# Patient Record
Sex: FEMALE | Race: WHITE | NOT HISPANIC OR LATINO | Employment: OTHER | URBAN - METROPOLITAN AREA
[De-identification: names, ages, dates, MRNs, and addresses within clinical notes are randomized per-mention and may not be internally consistent; named-entity substitution may affect disease eponyms.]

---

## 2017-01-05 ENCOUNTER — OFFICE VISIT - HEALTHEAST (OUTPATIENT)
Dept: INTERNAL MEDICINE | Facility: CLINIC | Age: 72
End: 2017-01-05

## 2017-01-05 DIAGNOSIS — E06.3 HYPOTHYROIDISM DUE TO HASHIMOTO'S THYROIDITIS: ICD-10-CM

## 2017-01-05 ASSESSMENT — MIFFLIN-ST. JEOR: SCORE: 1279.92

## 2017-01-06 ENCOUNTER — COMMUNICATION - HEALTHEAST (OUTPATIENT)
Dept: INTERNAL MEDICINE | Facility: CLINIC | Age: 72
End: 2017-01-06

## 2017-01-06 ENCOUNTER — AMBULATORY - HEALTHEAST (OUTPATIENT)
Dept: INTERNAL MEDICINE | Facility: CLINIC | Age: 72
End: 2017-01-06

## 2017-02-08 ENCOUNTER — HOSPITAL ENCOUNTER (OUTPATIENT)
Dept: MAMMOGRAPHY | Facility: CLINIC | Age: 72
Discharge: HOME | End: 2017-02-08
Attending: INTERNAL MEDICINE

## 2017-02-08 DIAGNOSIS — Z12.31 VISIT FOR SCREENING MAMMOGRAM: ICD-10-CM

## 2017-03-30 ENCOUNTER — RECORDS - HEALTHEAST (OUTPATIENT)
Dept: ADMINISTRATIVE | Facility: OTHER | Age: 72
End: 2017-03-30

## 2017-04-20 ENCOUNTER — OFFICE VISIT - HEALTHEAST (OUTPATIENT)
Dept: INTERNAL MEDICINE | Facility: CLINIC | Age: 72
End: 2017-04-20

## 2017-04-20 DIAGNOSIS — Z00.00 ROUTINE GENERAL MEDICAL EXAMINATION AT A HEALTH CARE FACILITY: ICD-10-CM

## 2017-04-20 DIAGNOSIS — I10 ESSENTIAL HYPERTENSION WITH GOAL BLOOD PRESSURE LESS THAN 140/90: ICD-10-CM

## 2017-04-20 ASSESSMENT — MIFFLIN-ST. JEOR: SCORE: 1307.14

## 2017-04-21 LAB
CHOLEST SERPL-MCNC: 192 MG/DL
FASTING STATUS PATIENT QL REPORTED: YES
HDLC SERPL-MCNC: 56 MG/DL
LDLC SERPL CALC-MCNC: 104 MG/DL
TRIGL SERPL-MCNC: 158 MG/DL

## 2017-04-24 ENCOUNTER — COMMUNICATION - HEALTHEAST (OUTPATIENT)
Dept: INTERNAL MEDICINE | Facility: CLINIC | Age: 72
End: 2017-04-24

## 2017-05-09 ENCOUNTER — COMMUNICATION - HEALTHEAST (OUTPATIENT)
Dept: INTERNAL MEDICINE | Facility: CLINIC | Age: 72
End: 2017-05-09

## 2017-05-09 ENCOUNTER — OFFICE VISIT - HEALTHEAST (OUTPATIENT)
Dept: INTERNAL MEDICINE | Facility: CLINIC | Age: 72
End: 2017-05-09

## 2017-05-09 DIAGNOSIS — I82.412 ACUTE DEEP VEIN THROMBOSIS (DVT) OF FEMORAL VEIN OF LEFT LOWER EXTREMITY (H): ICD-10-CM

## 2017-05-09 DIAGNOSIS — M21.371 FOOT DROP, RIGHT: ICD-10-CM

## 2017-05-09 DIAGNOSIS — I87.1 MAY-THURNER SYNDROME: ICD-10-CM

## 2017-05-09 DIAGNOSIS — S72.009A FEMORAL NECK FRACTURE (H): ICD-10-CM

## 2017-05-09 ASSESSMENT — MIFFLIN-ST. JEOR: SCORE: 1315.08

## 2017-05-10 ENCOUNTER — COMMUNICATION - HEALTHEAST (OUTPATIENT)
Dept: INTERNAL MEDICINE | Facility: CLINIC | Age: 72
End: 2017-05-10

## 2017-05-10 DIAGNOSIS — I82.412 ACUTE DEEP VEIN THROMBOSIS (DVT) OF FEMORAL VEIN OF LEFT LOWER EXTREMITY (H): ICD-10-CM

## 2017-05-12 ENCOUNTER — COMMUNICATION - HEALTHEAST (OUTPATIENT)
Dept: INFUSION THERAPY | Facility: HOSPITAL | Age: 72
End: 2017-05-12

## 2017-05-12 ENCOUNTER — COMMUNICATION - HEALTHEAST (OUTPATIENT)
Dept: NURSING | Facility: CLINIC | Age: 72
End: 2017-05-12

## 2017-05-12 DIAGNOSIS — I82.412 ACUTE DEEP VEIN THROMBOSIS (DVT) OF FEMORAL VEIN OF LEFT LOWER EXTREMITY (H): ICD-10-CM

## 2017-05-15 ENCOUNTER — COMMUNICATION - HEALTHEAST (OUTPATIENT)
Dept: INTERNAL MEDICINE | Facility: CLINIC | Age: 72
End: 2017-05-15

## 2017-06-01 ENCOUNTER — OFFICE VISIT - HEALTHEAST (OUTPATIENT)
Dept: INTERNAL MEDICINE | Facility: CLINIC | Age: 72
End: 2017-06-01

## 2017-06-01 DIAGNOSIS — I82.422 ACUTE DEEP VEIN THROMBOSIS (DVT) OF ILIAC VEIN OF LEFT LOWER EXTREMITY (H): ICD-10-CM

## 2017-06-01 ASSESSMENT — MIFFLIN-ST. JEOR: SCORE: 1296.94

## 2017-07-01 ENCOUNTER — RECORDS - HEALTHEAST (OUTPATIENT)
Dept: ADMINISTRATIVE | Facility: OTHER | Age: 72
End: 2017-07-01

## 2017-07-20 ENCOUNTER — RECORDS - HEALTHEAST (OUTPATIENT)
Dept: ADMINISTRATIVE | Facility: OTHER | Age: 72
End: 2017-07-20

## 2017-07-21 ENCOUNTER — RECORDS - HEALTHEAST (OUTPATIENT)
Dept: ADMINISTRATIVE | Facility: OTHER | Age: 72
End: 2017-07-21

## 2017-07-26 ENCOUNTER — RECORDS - HEALTHEAST (OUTPATIENT)
Dept: ADMINISTRATIVE | Facility: OTHER | Age: 72
End: 2017-07-26

## 2017-08-11 ENCOUNTER — OFFICE VISIT - HEALTHEAST (OUTPATIENT)
Dept: INTERNAL MEDICINE | Facility: CLINIC | Age: 72
End: 2017-08-11

## 2017-08-11 DIAGNOSIS — S72.001S: ICD-10-CM

## 2017-08-11 ASSESSMENT — MIFFLIN-ST. JEOR: SCORE: 1301.47

## 2017-08-30 ENCOUNTER — AMBULATORY - HEALTHEAST (OUTPATIENT)
Dept: ONCOLOGY | Facility: HOSPITAL | Age: 72
End: 2017-08-30

## 2017-08-30 DIAGNOSIS — I87.1 MAY-THURNER SYNDROME: ICD-10-CM

## 2017-08-30 DIAGNOSIS — I82.422 ACUTE DEEP VEIN THROMBOSIS (DVT) OF ILIAC VEIN OF LEFT LOWER EXTREMITY (H): ICD-10-CM

## 2017-09-19 ENCOUNTER — COMMUNICATION - HEALTHEAST (OUTPATIENT)
Dept: INTERNAL MEDICINE | Facility: CLINIC | Age: 72
End: 2017-09-19

## 2017-09-19 ENCOUNTER — AMBULATORY - HEALTHEAST (OUTPATIENT)
Dept: INTERNAL MEDICINE | Facility: CLINIC | Age: 72
End: 2017-09-19

## 2017-10-19 ENCOUNTER — AMBULATORY - HEALTHEAST (OUTPATIENT)
Dept: NURSING | Facility: CLINIC | Age: 72
End: 2017-10-19

## 2017-11-08 ENCOUNTER — AMBULATORY - HEALTHEAST (OUTPATIENT)
Dept: INFUSION THERAPY | Facility: HOSPITAL | Age: 72
End: 2017-11-08

## 2017-11-08 DIAGNOSIS — I82.422 ACUTE DEEP VEIN THROMBOSIS (DVT) OF ILIAC VEIN OF LEFT LOWER EXTREMITY (H): ICD-10-CM

## 2017-11-13 ENCOUNTER — COMMUNICATION - HEALTHEAST (OUTPATIENT)
Dept: ADMINISTRATIVE | Facility: HOSPITAL | Age: 72
End: 2017-11-13

## 2017-11-15 ENCOUNTER — OFFICE VISIT - HEALTHEAST (OUTPATIENT)
Dept: ONCOLOGY | Facility: HOSPITAL | Age: 72
End: 2017-11-15

## 2017-11-15 DIAGNOSIS — I82.412 ACUTE DEEP VEIN THROMBOSIS (DVT) OF FEMORAL VEIN OF LEFT LOWER EXTREMITY (H): ICD-10-CM

## 2017-11-15 LAB
DRVVT, LUPUS ANTICOAGULANT - HISTORICAL: 72 SEC
PROTEIN C ACTIVITY - HISTORICAL: 136 % (ref 76–137)
PROTEIN S ACTIVITY - HISTORICAL: 119 % (ref 62–102)

## 2018-01-19 ENCOUNTER — COMMUNICATION - HEALTHEAST (OUTPATIENT)
Dept: INTERNAL MEDICINE | Facility: CLINIC | Age: 73
End: 2018-01-19

## 2018-01-22 ENCOUNTER — RECORDS - HEALTHEAST (OUTPATIENT)
Dept: ADMINISTRATIVE | Facility: OTHER | Age: 73
End: 2018-01-22

## 2018-01-22 ENCOUNTER — RECORDS - HEALTHEAST (OUTPATIENT)
Dept: GENERAL RADIOLOGY | Facility: CLINIC | Age: 73
End: 2018-01-22

## 2018-01-22 ENCOUNTER — OFFICE VISIT - HEALTHEAST (OUTPATIENT)
Dept: INTERNAL MEDICINE | Facility: CLINIC | Age: 73
End: 2018-01-22

## 2018-01-22 DIAGNOSIS — R60.9 EDEMA: ICD-10-CM

## 2018-01-22 DIAGNOSIS — R60.9 EDEMA, UNSPECIFIED: ICD-10-CM

## 2018-01-22 ASSESSMENT — MIFFLIN-ST. JEOR: SCORE: 1333.22

## 2018-02-12 ENCOUNTER — HOSPITAL ENCOUNTER (OUTPATIENT)
Dept: MAMMOGRAPHY | Facility: CLINIC | Age: 73
Discharge: HOME | End: 2018-02-12
Attending: INTERNAL MEDICINE

## 2018-02-12 DIAGNOSIS — Z12.31 VISIT FOR SCREENING MAMMOGRAM: ICD-10-CM

## 2018-02-15 ENCOUNTER — COMMUNICATION - HEALTHEAST (OUTPATIENT)
Dept: INTERNAL MEDICINE | Facility: CLINIC | Age: 73
End: 2018-02-15

## 2018-04-20 ENCOUNTER — RECORDS - HEALTHEAST (OUTPATIENT)
Dept: ADMINISTRATIVE | Facility: OTHER | Age: 73
End: 2018-04-20

## 2018-05-07 ENCOUNTER — AMBULATORY - HEALTHEAST (OUTPATIENT)
Dept: INFUSION THERAPY | Facility: HOSPITAL | Age: 73
End: 2018-05-07

## 2018-05-07 DIAGNOSIS — I82.412 ACUTE DEEP VEIN THROMBOSIS (DVT) OF FEMORAL VEIN OF LEFT LOWER EXTREMITY (H): ICD-10-CM

## 2018-05-07 LAB
BASOPHILS # BLD AUTO: 0 THOU/UL (ref 0–0.2)
BASOPHILS NFR BLD AUTO: 1 % (ref 0–2)
EOSINOPHIL # BLD AUTO: 0.1 THOU/UL (ref 0–0.4)
EOSINOPHIL NFR BLD AUTO: 2 % (ref 0–6)
ERYTHROCYTE [DISTWIDTH] IN BLOOD BY AUTOMATED COUNT: 13.2 % (ref 11–14.5)
HCT VFR BLD AUTO: 40.7 % (ref 35–47)
HGB BLD-MCNC: 13.4 G/DL (ref 12–16)
LYMPHOCYTES # BLD AUTO: 1.1 THOU/UL (ref 0.8–4.4)
LYMPHOCYTES NFR BLD AUTO: 23 % (ref 20–40)
MCH RBC QN AUTO: 29.8 PG (ref 27–34)
MCHC RBC AUTO-ENTMCNC: 32.9 G/DL (ref 32–36)
MCV RBC AUTO: 90 FL (ref 80–100)
MONOCYTES # BLD AUTO: 0.4 THOU/UL (ref 0–0.9)
MONOCYTES NFR BLD AUTO: 8 % (ref 2–10)
NEUTROPHILS # BLD AUTO: 3.4 THOU/UL (ref 2–7.7)
NEUTROPHILS NFR BLD AUTO: 67 % (ref 50–70)
PLATELET # BLD AUTO: 186 THOU/UL (ref 140–440)
PMV BLD AUTO: 11.2 FL (ref 8.5–12.5)
RBC # BLD AUTO: 4.5 MILL/UL (ref 3.8–5.4)
WBC: 5.1 THOU/UL (ref 4–11)

## 2018-05-10 ENCOUNTER — COMMUNICATION - HEALTHEAST (OUTPATIENT)
Dept: ONCOLOGY | Facility: HOSPITAL | Age: 73
End: 2018-05-10

## 2018-05-10 LAB — LA PPP-IMP: POSITIVE

## 2018-05-14 ENCOUNTER — OFFICE VISIT - HEALTHEAST (OUTPATIENT)
Dept: ONCOLOGY | Facility: HOSPITAL | Age: 73
End: 2018-05-14

## 2018-05-14 DIAGNOSIS — I82.412 ACUTE DEEP VEIN THROMBOSIS (DVT) OF FEMORAL VEIN OF LEFT LOWER EXTREMITY (H): ICD-10-CM

## 2018-05-14 DIAGNOSIS — I82.422 ACUTE DEEP VEIN THROMBOSIS (DVT) OF ILIAC VEIN OF LEFT LOWER EXTREMITY (H): ICD-10-CM

## 2018-05-14 DIAGNOSIS — I87.1 MAY-THURNER SYNDROME: ICD-10-CM

## 2018-05-14 DIAGNOSIS — R76.0 LUPUS ANTICOAGULANT POSITIVE: ICD-10-CM

## 2018-06-11 ENCOUNTER — OFFICE VISIT - HEALTHEAST (OUTPATIENT)
Dept: INTERNAL MEDICINE | Facility: CLINIC | Age: 73
End: 2018-06-11

## 2018-06-11 DIAGNOSIS — Z00.00 ROUTINE GENERAL MEDICAL EXAMINATION AT A HEALTH CARE FACILITY: ICD-10-CM

## 2018-06-11 DIAGNOSIS — Z12.11 SCREEN FOR COLON CANCER: ICD-10-CM

## 2018-06-11 LAB
ALBUMIN SERPL-MCNC: 3.9 G/DL (ref 3.5–5)
ALBUMIN UR-MCNC: NEGATIVE MG/DL
ALP SERPL-CCNC: 79 U/L (ref 45–120)
ALT SERPL W P-5'-P-CCNC: 20 U/L (ref 0–45)
ANION GAP SERPL CALCULATED.3IONS-SCNC: 10 MMOL/L (ref 5–18)
APPEARANCE UR: CLEAR
AST SERPL W P-5'-P-CCNC: 22 U/L (ref 0–40)
BILIRUB SERPL-MCNC: 0.5 MG/DL (ref 0–1)
BILIRUB UR QL STRIP: NEGATIVE
BUN SERPL-MCNC: 16 MG/DL (ref 8–28)
CALCIUM SERPL-MCNC: 9.3 MG/DL (ref 8.5–10.5)
CHLORIDE BLD-SCNC: 107 MMOL/L (ref 98–107)
CHOLEST SERPL-MCNC: 209 MG/DL
CO2 SERPL-SCNC: 24 MMOL/L (ref 22–31)
COLOR UR AUTO: YELLOW
CREAT SERPL-MCNC: 0.73 MG/DL (ref 0.6–1.1)
ERYTHROCYTE [DISTWIDTH] IN BLOOD BY AUTOMATED COUNT: 12.5 % (ref 11–14.5)
FASTING STATUS PATIENT QL REPORTED: YES
GFR SERPL CREATININE-BSD FRML MDRD: >60 ML/MIN/1.73M2
GLUCOSE BLD-MCNC: 99 MG/DL (ref 70–125)
GLUCOSE UR STRIP-MCNC: NEGATIVE MG/DL
HCT VFR BLD AUTO: 42.2 % (ref 35–47)
HDLC SERPL-MCNC: 54 MG/DL
HGB BLD-MCNC: 14 G/DL (ref 12–16)
HGB UR QL STRIP: NEGATIVE
KETONES UR STRIP-MCNC: NEGATIVE MG/DL
LDLC SERPL CALC-MCNC: 131 MG/DL
LEUKOCYTE ESTERASE UR QL STRIP: NEGATIVE
MCH RBC QN AUTO: 30 PG (ref 27–34)
MCHC RBC AUTO-ENTMCNC: 33.3 G/DL (ref 32–36)
MCV RBC AUTO: 90 FL (ref 80–100)
NITRATE UR QL: NEGATIVE
PH UR STRIP: 6 [PH] (ref 5–8)
PLATELET # BLD AUTO: 194 THOU/UL (ref 140–440)
PMV BLD AUTO: 8.3 FL (ref 7–10)
POTASSIUM BLD-SCNC: 4.1 MMOL/L (ref 3.5–5)
PROT SERPL-MCNC: 7.1 G/DL (ref 6–8)
RBC # BLD AUTO: 4.68 MILL/UL (ref 3.8–5.4)
SODIUM SERPL-SCNC: 141 MMOL/L (ref 136–145)
SP GR UR STRIP: 1.01 (ref 1–1.03)
TRIGL SERPL-MCNC: 118 MG/DL
TSH SERPL DL<=0.005 MIU/L-ACNC: 0.84 UIU/ML (ref 0.3–5)
UROBILINOGEN UR STRIP-ACNC: NORMAL
WBC: 5.8 THOU/UL (ref 4–11)

## 2018-06-11 ASSESSMENT — MIFFLIN-ST. JEOR: SCORE: 1333.22

## 2018-06-12 ENCOUNTER — COMMUNICATION - HEALTHEAST (OUTPATIENT)
Dept: INTERNAL MEDICINE | Facility: CLINIC | Age: 73
End: 2018-06-12

## 2018-06-12 LAB — C REACTIVE PROTEIN LHE: <0.1 MG/DL (ref 0–0.8)

## 2018-07-17 ENCOUNTER — COMMUNICATION - HEALTHEAST (OUTPATIENT)
Dept: SCHEDULING | Facility: CLINIC | Age: 73
End: 2018-07-17

## 2018-08-06 ENCOUNTER — COMMUNICATION - HEALTHEAST (OUTPATIENT)
Dept: INTERNAL MEDICINE | Facility: CLINIC | Age: 73
End: 2018-08-06

## 2018-08-07 ENCOUNTER — OFFICE VISIT - HEALTHEAST (OUTPATIENT)
Dept: INTERNAL MEDICINE | Facility: CLINIC | Age: 73
End: 2018-08-07

## 2018-08-07 ENCOUNTER — COMMUNICATION - HEALTHEAST (OUTPATIENT)
Dept: INTERNAL MEDICINE | Facility: CLINIC | Age: 73
End: 2018-08-07

## 2018-08-07 ENCOUNTER — COMMUNICATION - HEALTHEAST (OUTPATIENT)
Dept: INFECTIOUS DISEASES | Facility: CLINIC | Age: 73
End: 2018-08-07

## 2018-08-07 DIAGNOSIS — L03.90 CELLULITIS: ICD-10-CM

## 2018-08-07 LAB
ERYTHROCYTE [DISTWIDTH] IN BLOOD BY AUTOMATED COUNT: 11.9 % (ref 11–14.5)
HCT VFR BLD AUTO: 38.8 % (ref 35–47)
HGB BLD-MCNC: 12.8 G/DL (ref 12–16)
MCH RBC QN AUTO: 29.7 PG (ref 27–34)
MCHC RBC AUTO-ENTMCNC: 33 G/DL (ref 32–36)
MCV RBC AUTO: 90 FL (ref 80–100)
PLATELET # BLD AUTO: 227 THOU/UL (ref 140–440)
PMV BLD AUTO: 9.5 FL (ref 7–10)
RBC # BLD AUTO: 4.31 MILL/UL (ref 3.8–5.4)
WBC: 5.5 THOU/UL (ref 4–11)

## 2018-08-07 ASSESSMENT — MIFFLIN-ST. JEOR: SCORE: 1324.15

## 2018-08-15 ENCOUNTER — OFFICE VISIT - HEALTHEAST (OUTPATIENT)
Dept: INFECTIOUS DISEASES | Facility: CLINIC | Age: 73
End: 2018-08-15

## 2018-08-15 DIAGNOSIS — L03.115 CELLULITIS OF RIGHT LOWER EXTREMITY: ICD-10-CM

## 2018-08-15 DIAGNOSIS — B35.3 TINEA PEDIS OF RIGHT FOOT: ICD-10-CM

## 2018-08-15 ASSESSMENT — MIFFLIN-ST. JEOR: SCORE: 1306.01

## 2018-08-21 ENCOUNTER — OFFICE VISIT - HEALTHEAST (OUTPATIENT)
Dept: INTERNAL MEDICINE | Facility: CLINIC | Age: 73
End: 2018-08-21

## 2018-08-21 DIAGNOSIS — I10 ESSENTIAL HYPERTENSION: ICD-10-CM

## 2018-08-21 ASSESSMENT — MIFFLIN-ST. JEOR: SCORE: 1301.47

## 2018-10-23 ENCOUNTER — COMMUNICATION - HEALTHEAST (OUTPATIENT)
Dept: INTERNAL MEDICINE | Facility: CLINIC | Age: 73
End: 2018-10-23

## 2018-10-23 DIAGNOSIS — I10 ESSENTIAL HYPERTENSION: ICD-10-CM

## 2018-10-23 RX ORDER — METOPROLOL SUCCINATE 25 MG/1
TABLET, EXTENDED RELEASE ORAL
Qty: 90 TABLET | Refills: 3 | Status: SHIPPED | OUTPATIENT
Start: 2018-10-23

## 2018-11-19 ENCOUNTER — COMMUNICATION - HEALTHEAST (OUTPATIENT)
Dept: INTERNAL MEDICINE | Facility: CLINIC | Age: 73
End: 2018-11-19

## 2018-11-30 ENCOUNTER — RECORDS - HEALTHEAST (OUTPATIENT)
Dept: ADMINISTRATIVE | Facility: OTHER | Age: 73
End: 2018-11-30

## 2018-12-21 ENCOUNTER — OFFICE VISIT - HEALTHEAST (OUTPATIENT)
Dept: INTERNAL MEDICINE | Facility: CLINIC | Age: 73
End: 2018-12-21

## 2018-12-21 DIAGNOSIS — I10 ESSENTIAL HYPERTENSION: ICD-10-CM

## 2018-12-21 LAB — TSH SERPL DL<=0.005 MIU/L-ACNC: 1.92 UIU/ML (ref 0.3–5)

## 2018-12-21 ASSESSMENT — MIFFLIN-ST. JEOR: SCORE: 1319.62

## 2018-12-24 ENCOUNTER — COMMUNICATION - HEALTHEAST (OUTPATIENT)
Dept: INTERNAL MEDICINE | Facility: CLINIC | Age: 73
End: 2018-12-24

## 2018-12-28 ENCOUNTER — COMMUNICATION - HEALTHEAST (OUTPATIENT)
Dept: INTERNAL MEDICINE | Facility: CLINIC | Age: 73
End: 2018-12-28

## 2018-12-29 RX ORDER — LEVOTHYROXINE SODIUM 75 MCG
TABLET ORAL
Qty: 90 TABLET | Refills: 3 | Status: SHIPPED | OUTPATIENT
Start: 2018-12-29

## 2019-02-25 ENCOUNTER — HOSPITAL ENCOUNTER (OUTPATIENT)
Dept: MAMMOGRAPHY | Facility: CLINIC | Age: 74
Discharge: HOME | End: 2019-02-25

## 2019-02-25 DIAGNOSIS — Z12.31 VISIT FOR SCREENING MAMMOGRAM: ICD-10-CM

## 2019-05-09 ENCOUNTER — COMMUNICATION - HEALTHEAST (OUTPATIENT)
Dept: ONCOLOGY | Facility: HOSPITAL | Age: 74
End: 2019-05-09

## 2020-02-27 ENCOUNTER — HOSPITAL ENCOUNTER (OUTPATIENT)
Dept: MAMMOGRAPHY | Facility: CLINIC | Age: 75
Discharge: HOME | End: 2020-02-27

## 2020-02-27 DIAGNOSIS — Z12.31 VISIT FOR SCREENING MAMMOGRAM: ICD-10-CM

## 2021-05-17 ENCOUNTER — HOSPITAL ENCOUNTER (OUTPATIENT)
Dept: MAMMOGRAPHY | Facility: CLINIC | Age: 76
Discharge: HOME | End: 2021-05-17
Attending: OBSTETRICS & GYNECOLOGY

## 2021-05-17 DIAGNOSIS — Z12.31 VISIT FOR SCREENING MAMMOGRAM: ICD-10-CM

## 2021-05-20 ENCOUNTER — HOSPITAL ENCOUNTER (OUTPATIENT)
Dept: MAMMOGRAPHY | Facility: CLINIC | Age: 76
Discharge: HOME | End: 2021-05-20
Attending: OBSTETRICS & GYNECOLOGY

## 2021-05-20 ENCOUNTER — HOSPITAL ENCOUNTER (OUTPATIENT)
Dept: ULTRASOUND IMAGING | Facility: CLINIC | Age: 76
Discharge: HOME | End: 2021-05-20
Attending: OBSTETRICS & GYNECOLOGY

## 2021-05-20 DIAGNOSIS — N64.89 BREAST ASYMMETRY: ICD-10-CM

## 2021-05-25 ENCOUNTER — RECORDS - HEALTHEAST (OUTPATIENT)
Dept: ADMINISTRATIVE | Facility: CLINIC | Age: 76
End: 2021-05-25

## 2021-05-26 ENCOUNTER — RECORDS - HEALTHEAST (OUTPATIENT)
Dept: ADMINISTRATIVE | Facility: CLINIC | Age: 76
End: 2021-05-26

## 2021-05-27 ENCOUNTER — RECORDS - HEALTHEAST (OUTPATIENT)
Dept: ADMINISTRATIVE | Facility: CLINIC | Age: 76
End: 2021-05-27

## 2021-05-28 ENCOUNTER — RECORDS - HEALTHEAST (OUTPATIENT)
Dept: ADMINISTRATIVE | Facility: CLINIC | Age: 76
End: 2021-05-28

## 2021-05-28 NOTE — TELEPHONE ENCOUNTER
Called pt to schedule her one year clinic visit with DR mejia. Patient had new insurance and Dr Mejia is no longer in her network. Patient transfer care to [facility].   She thanks Dr mejia for all his care.    Message routed to Dr Mejia and Hina REES RN

## 2021-05-29 ENCOUNTER — RECORDS - HEALTHEAST (OUTPATIENT)
Dept: ADMINISTRATIVE | Facility: CLINIC | Age: 76
End: 2021-05-29

## 2021-05-30 ENCOUNTER — RECORDS - HEALTHEAST (OUTPATIENT)
Dept: ADMINISTRATIVE | Facility: CLINIC | Age: 76
End: 2021-05-30

## 2021-05-30 VITALS — WEIGHT: 176 LBS | BODY MASS INDEX: 28.28 KG/M2 | HEIGHT: 66 IN

## 2021-05-30 VITALS — WEIGHT: 170 LBS | HEIGHT: 66 IN | BODY MASS INDEX: 27.32 KG/M2

## 2021-05-31 VITALS — BODY MASS INDEX: 28.93 KG/M2 | WEIGHT: 180 LBS | HEIGHT: 66 IN

## 2021-05-31 VITALS — WEIGHT: 172 LBS | HEIGHT: 66 IN | BODY MASS INDEX: 27.64 KG/M2

## 2021-05-31 VITALS — HEIGHT: 66 IN | WEIGHT: 173 LBS | BODY MASS INDEX: 27.8 KG/M2

## 2021-05-31 VITALS — BODY MASS INDEX: 29.28 KG/M2 | WEIGHT: 181.4 LBS

## 2021-06-01 VITALS — WEIGHT: 180 LBS | BODY MASS INDEX: 28.93 KG/M2 | HEIGHT: 66 IN

## 2021-06-01 VITALS — WEIGHT: 174 LBS | HEIGHT: 66 IN | BODY MASS INDEX: 27.97 KG/M2

## 2021-06-01 VITALS — BODY MASS INDEX: 28.61 KG/M2 | WEIGHT: 178 LBS | HEIGHT: 66 IN

## 2021-06-01 VITALS — WEIGHT: 173 LBS | BODY MASS INDEX: 27.8 KG/M2 | HEIGHT: 66 IN

## 2021-06-01 VITALS — WEIGHT: 184.5 LBS | BODY MASS INDEX: 29.78 KG/M2

## 2021-06-02 VITALS — BODY MASS INDEX: 28.45 KG/M2 | HEIGHT: 66 IN | WEIGHT: 177 LBS

## 2021-06-08 NOTE — PROGRESS NOTES
Office Visit - Follow up    Maggie Nova   71 y.o. female    Date of Visit: 1/5/2017    Chief Complaint   Patient presents with     Hypertension     Hypothyroidism       Subjective: Hashimoto's thyroiditis with hypertension and foot drop right side after right hip fracture.  Offers no new complaints denies blood in stool or urine no chest pain or shortness of breath no palpitations.    Medication list reviewed and well tolerated.  With cough syrup and prednisone therapy her cough dissipated after 5 days.  Patient very appreciative.    ROS: A comprehensive review of systems was performed and was otherwise negative    Medications:  Prior to Admission medications    Medication Sig Start Date End Date Taking? Authorizing Provider   atenolol (TENORMIN) 25 MG tablet take one tablet by mouth every day 11/21/16  Yes Asad Mendieta MD   chlorhexidine (PERIDEX) 0.12 % solution  10/17/16  Yes PROVIDER, HISTORICAL   cholecalciferol, vitamin D3, 1,000 unit tablet Take 1,000 Units by mouth every evening.    Yes PROVIDER, HISTORICAL   SYNTHROID 75 mcg tablet TAKE ONE TABLET BY MOUTH EVERY DAY (MD PACE) 2/8/16  Yes Burton Alford MD   codeine-guaiFENesin (GUAIFENESIN AC)  mg/5 mL liquid Take 5 mL by mouth 3 (three) times a day as needed for cough. 11/21/16 1/5/17  Jose Juan Ron MD   predniSONE (DELTASONE) 10 MG tablet 1 tab twice daily for 5 days 11/21/16 1/5/17  Jose Juan Ron MD       Allergies:   Allergies   Allergen Reactions     Ibuprofen Other (See Comments)     Stomach ache       Immunizations:   Immunization History   Administered Date(s) Administered     DT (pediatric) 01/10/2005     Td, historic 01/10/2005       Exam Chest clear to auscultation and percussion.  Heart tones regular rhythm without murmur rub or gallop.  Abdomen soft nontender no organomegaly.  No peritoneal signs.  Extremities free of edema cyanosis or clubbing.  Neck veins nondistended no thyromegaly or scleral icterus noted,  carotids full.  Skin warm and dry easily conversant good spirited.  Normal intelligence.  Neurologically intact no gross localizing findings.      Assessment and Plan   Hashimoto's thyroiditis check TSH level today continue levothyroxine 75  g daily.    Cough improved with prednisone and guaifenesin codeine cough syrup.    Ibuprofen allergy.    Hypertension control.    Colonoscopy normal on 420 80 a [facility] note negative mammogram also negative January 27, 2016.    Time: total time spent with the patient was 25 minutes of which >50% was spent in counseling and coordination of care    The following high BMI interventions were performed this visit: encouragement to exercise    Burton Alford MD    Patient Active Problem List   Diagnosis     Hemorrhoids     Essential Hypertension     Osteoarthritis Of The Knee     Hypothyroidism     Right Femoral neck fracture     Foot drop, right

## 2021-06-10 NOTE — PROGRESS NOTES
Office Visit - Follow Up   Maggie Nova   72 y.o. female    Date of Visit: 5/9/2017    Chief Complaint   Patient presents with     Hospital Visit Follow Up     Follow up DVT left leg, feels good, on Eliquis        Assessment and Plan   1. May-Thurner syndrome  Overall she is doing well after her stent graft placement to her left iliac vein.  Left leg still has some swelling but there is no tenderness.  He remains on Eliquis 5 mg twice per day.  She is seeing Dr. William in follow-up regarding her recent diagnosis of this syndrome.  Decision on length of treatment with anticoagulants will be made then.    2. Foot drop, right  Is chronic and unchanged.    3. Femoral neck fracture  This occurred in 3/2016.  I do not think this at all relates to her as an illness with occlusion of her left iliac vein.          No Follow-up on file. he has an appointment to see Dr. Alford in follow-up in early June/2017 she will keep this appointment and she will be seeing her oncologist/hematologist soon.       History of Present Illness   This 72 y.o. old was just discharged from [facility].  She presented with swelling and pain in her entire left leg.  She seem to have prominent occlusion of the veins in her left leg.  Further investigation found that she had so occlusion of her left iliac vein just below the bifurcation of the aorta.  He was taken for venography and was found to have a typical appearance for May Thurner syndrome which is indeed occlusion of the left iliac vein to downward pressure from the right iliac artery.  This is a rare abnormality.  This was treated by interventional radiology with a stent graft placed in her left iliac vein.  This completely opened up her venous system in her left veins of the leg and the swelling in her left leg reduced dramatically.  He was placed on Eliquis.  She was seen in consultation by hematology, Dr. William and he recommends continued use of Eliquis for the foreseeable  "future.  I am unaware as to whether this should be a lifelong anticoagulant use or only 3-6 months.  She was told to avoid aspirin and NSAIDs at this time.  Since her procedure she thinks her left leg is improving nicely.  She has had a recent right femoral neck fracture and I think this is unrelated to any problems with her occluded veins as above.    Review of Systems: A comprehensive review of systems was negative except as noted.     Medications, Allergies and Problem List   Reviewed and updated     Physical Exam   General Appearance:       /70 (Patient Site: Right Arm, Patient Position: Sitting)  Pulse 69  Ht 5' 6\" (1.676 m)  Wt 176 lb (79.8 kg)  SpO2 99%  BMI 28.41 kg/m2    There are no bruits in the right or left femoral arteries.  She has 1-2+ edema primarily in her left lower leg but also just minimal edema of her left upper thigh.  She has some mild residual erythema but this is nontender.  Rhythm is regular.  No murmurs.  Lungs are clear.       Additional Information   Current Outpatient Prescriptions   Medication Sig Dispense Refill     atenolol (TENORMIN) 25 MG tablet Take 25 mg by mouth daily.       cholecalciferol, vitamin D3, 1,000 unit tablet Take 2,000 Units by mouth every evening.        levothyroxine (SYNTHROID, LEVOTHROID) 75 MCG tablet Take 75 mcg by mouth Daily at 6:00 am. SANJEEV per PMD       apixaban (ELIQUIS) 5 mg Tab tablet Take 2 tablets (10 mg total) by mouth 2 (two) times a day. 60 tablet 3     No current facility-administered medications for this visit.      Allergies   Allergen Reactions     Ibuprofen Other (See Comments)     Stomach ache     Social History   Substance Use Topics     Smoking status: Former Smoker     Quit date: 1/1/1976     Smokeless tobacco: Never Used      Comment: quit in her 30's     Alcohol use No       Review and/or order of clinical lab tests:  Review and/or order of radiology tests:  Review and/or order of medicine tests:  Discussion of test results " with performing physician:  Decision to obtain old records and/or obtain history from someone other than the patient:  Review and summarization of old records and/or obtaining history from someone other than the patient and.or discussion of case with another health care provider:  Independent visualization of image, tracing or specimen itself:    Time: total time spent with the patient was 25 minutes of which >50% was spent in counseling and coordination of care     Jesus Pizarro MD

## 2021-06-10 NOTE — PROGRESS NOTES
Assessment and Plan:   General medical examination with hypertension and right foot drop after right hip fracture seen by Dr. Jose Juan Velarde orthopedist locally.  Referred to the [facility] for further evaluation regarding persistent right foot drop after compound fracture right hip    Hypothyroidism on replacement.    Overweight BMI 29.  Discussed see below.    1. Routine general medical examination at a health care facility    - 2(CBC w/o Differential)  - Comprehensive Metabolic Panel  - Lipid Cascade  - Thyroid Stimulating Hormone (TSH)  - Urinalysis-UC if Indicated  - Vitamin D, Total (25-Hydroxy)  - Vitamin B12    2. Essential hypertension with goal blood pressure less than 140/90    - HM2(CBC w/o Differential)  - Comprehensive Metabolic Panel  - Lipid Cascade  - Thyroid Stimulating Hormone (TSH)  - Urinalysis-UC if Indicated  - Vitamin D, Total (25-Hydroxy)  - Vitamin B12      The patient's current medical problems were reviewed.    I have had an Advance Directives discussion with the patient.  The following health maintenance schedule was reviewed with the patient and provided in printed form in the after visit summary:   Health Maintenance   Topic Date Due     ZOSTER VACCINE  01/31/2007     DXA SCAN  01/31/2012     TD 18+ HE  01/10/2015     INFLUENZA VACCINE RULE BASED (1) 08/01/2016     FALL RISK ASSESSMENT  03/23/2017     ADVANCE DIRECTIVES DISCUSSED WITH PATIENT  02/20/2018     COLONOSCOPY  04/21/2018     MAMMOGRAM  02/08/2019     PNEUMOCOCCAL POLYSACCHARIDE VACCINE AGE 65 AND OVER  Addressed     PNEUMOCOCCAL CONJUGATE VACCINE FOR ADULTS (PCV13 OR PREVNAR)  Addressed        Subjective:   Chief Complaint: Maggie Nova is an 72 y.o. female here for an Annual Wellness visit.   HPI: Non-smoker.    No excess alcohol.    Ibuprofen causes GI upset.    Right total hip arthroplasty after fracture.  This is a compound complex fracture by Dr. Mendez surgically repaired complicated by right foot drop  persisted.    [place] tooth extraction.    Illnesses hypothyroidism and hypertension on replacement and controlled previously.  Blood pressure today elevated see below 148/80.    Mother  age 81 lymphoma.    Father  uncertain because parents were estranged.  One daughter well 2 grandchildren well.    Last Pap smear done May 3, 2016 followed by gynecologist Dr. JENNA Dolan for annual breast pelvic and rectal exam and Pap smear not repeated.    Mammogram done 2017 negative.  Colonoscopy dated 2008 normal.    Review of Systems:    Please see above.  The rest of the review of systems are negative for all systems.    Patient Care Team:  Burton Alford MD as PCP - General  Jose Juan Velarde MD as Physician (Orthopedic Surgery)  Kalin Green MD as Physician (Neurology)  Lori Dolan MD as Physician (Obstetrics and Gynecology)     Patient Active Problem List   Diagnosis     Hemorrhoids     Essential Hypertension     Osteoarthritis Of The Knee     Hypothyroidism     Right Femoral neck fracture     Foot drop, right     Past Medical History:   Diagnosis Date     Hypertension      Hypothyroidism       Past Surgical History:   Procedure Laterality Date     PARTIAL HIP ARTHROPLASTY Right 3/28/2016    Procedure: RIGHT TOTAL HIP WITH OPEN REDUCTION INTERNAL FIXATION RIGHT FEMUR FRACTURE;  Surgeon: Jose Juan Velarde MD;  Location: [facility];  Service:      WISDOM TOOTH EXTRACTION        Family History   Problem Relation Age of Onset     Lymphoma Mother 80     Cancer Maternal Aunt      Biliary     Lung cancer Maternal Uncle       Social History     Social History     Marital status:      Spouse name: N/A     Number of children: 1     Years of education: N/A     Occupational History     Not on file.     Social History Main Topics     Smoking status: Former Smoker     Quit date: 1976     Smokeless tobacco: Never Used      Comment: quit in her 30's     Alcohol use No     Drug use:  "No     Sexual activity: Yes     Partners: Male     Other Topics Concern     Not on file     Social History Narrative    , one daughter who is a nurse at [employer].        Current Outpatient Prescriptions   Medication Sig Dispense Refill     atenolol (TENORMIN) 25 MG tablet take one tablet by mouth every day 90 tablet 3     chlorhexidine (PERIDEX) 0.12 % solution        cholecalciferol, vitamin D3, 1,000 unit tablet Take 1,000 Units by mouth every evening.        SYNTHROID 75 mcg tablet TAKE ONE TABLET BY MOUTH EVERY DAY (MD PACE) 90 tablet 3     No current facility-administered medications for this visit.       Objective:   Vital Signs:   Visit Vitals     /80     Pulse 72     Ht 5' 5.5\" (1.664 m)     Wt 176 lb (79.8 kg)     BMI 28.84 kg/m2        VisionScreening:  No exam data present     PHYSICAL EXAM  Chest clear to auscultation and percussion.  Heart tones regular rhythm without murmur rub or gallop.  Abdomen soft nontender no organomegaly.  No peritoneal signs.  Extremities free of edema cyanosis or clubbing.  Neck veins nondistended no thyromegaly or scleral icterus noted, carotids full.  Skin warm and dry easily conversant good spirited.  Normal intelligence.  Neurologically intact no gross localizing findings.  Rest of examination skin negative lymph negative neuro negative psych normal HEENT negative back straight no severe spine tenderness mild swelling noted left lower extremity distally right lower extremity the site of the fracture of the right hip complicated by foot drop no edema cyanosis or clubbing.  Abdomen benign mild centripetal obesity noted.  Breast pelvic and rectal exam with Pap smear per her gynecologist Dr. JENNA Dolan not repeated.    Assessment Results 4/20/2017   Activities of Daily Living No help needed   Instrumental Activities of Daily Living No help needed   Get Up and Go Score Less than 12 seconds   Mini Cog Total Score 3     A Mini-Cog score of 0-2 suggests the " possibility of dementia, score of 3-5 suggests no dementia    Identified Health Risks:     She is at risk for lack of exercise and has been provided with information to increase physical activity for the benefit of her well-being.  Information regarding advance directives (living walker), including where she can download the appropriate form, was provided to the patient via the AVS.

## 2021-06-11 NOTE — PROGRESS NOTES
Office Visit - Follow up    Maggie Nova   72 y.o. female    Date of Visit: 6/1/2017    Chief Complaint   Patient presents with     Hypertension     Follow-up     DVT left lower extrem.       Subjective: DVT left lower extremity near the femoral vein 2 stents previously deployed [facility].  Admitting diagnosis date May 3, 2017.  Initially thought to be an extensive DVT then just a small segment in the proximal femoral vein and also one distally venous stents were placed by interventional radiology.  One year ago had severe fracture right hip with resultant footdrop right lower extremity.  No blood in stool or urine no chest pain shortness of breath hemoptysis or pleurisy.  2 sisters with ankle edema but no family history of DVT pulmonary embolus.    Medication list reviewed well-tolerated normal effects.  No chest pain shortness of breath currently.  No cough.    ROS: A comprehensive review of systems was performed and was otherwise negative    Medications:  Prior to Admission medications    Medication Sig Start Date End Date Taking? Authorizing Provider   apixaban (ELIQUIS) 5 mg Tab tablet Take 1 tablet (5 mg total) by mouth 2 (two) times a day. 5/12/17  Yes Burton Alford MD   atenolol (TENORMIN) 25 MG tablet Take 25 mg by mouth daily.   Yes PROVIDER, HISTORICAL   cholecalciferol, vitamin D3, 1,000 unit tablet Take 2,000 Units by mouth every evening.    Yes PROVIDER, HISTORICAL   levothyroxine (SYNTHROID, LEVOTHROID) 75 MCG tablet Take 75 mcg by mouth Daily at 6:00 am. SANJEEV per PMD   Yes PROVIDER, HISTORICAL       Allergies:   Allergies   Allergen Reactions     Ibuprofen Other (See Comments)     Stomach ache       Immunizations:   Immunization History   Administered Date(s) Administered     DT (pediatric) 01/10/2005     Td, historic 01/10/2005       Exam Chest clear to auscultation and percussion.  Heart tones regular rhythm without murmur rub or gallop.  Abdomen soft nontender no organomegaly.   No peritoneal signs.  Extremities free of edema cyanosis or clubbing.  Neck veins nondistended no thyromegaly or scleral icterus noted, carotids full.  Skin warm and dry easily conversant good spirited.  Normal intelligence.  Neurologically intact no gross localizing findings.  Brace on right lower extremity distally foot drop is persistent uses a cane for ambulation.  Left leg showed minimal edema.  Ankle distribution with a slight reddish tinge to the leg but no clot palpable no cord palpable negative Homans sign distal pulses left lower extremity intact and strong no edema noted right lower extremity.    Assessment and Plan  Deep vein thrombosis left lower extremity status post 2 venous stents deployed [facility].    Continue on Eliquis 5 mg twice daily.    Hypertension control.    Foot drop after right hip fracture severe.    Hypothyroidism on replacement therapy.    Return to clinic 1 month's time will continue Eliquis through October 2017 with monthly visits in the interlude.    Postphlebitic syndrome left lower extremity?.    Time: total time spent with the patient was 25 minutes of which >50% was spent in counseling and coordination of care    The following high BMI interventions were performed this visit: encouragement to exercise    Burton Alford MD    Patient Active Problem List   Diagnosis     Hemorrhoids     Essential Hypertension     Osteoarthritis Of The Knee     Hypothyroidism     Right Femoral neck fracture     Foot drop, right     DVT of axillary vein, acute left     Lactic acidosis     Other elevated white blood cell (WBC) count     Hypokalemia     DVT (deep vein thrombosis) in pregnancy     Acute deep vein thrombosis (DVT) of iliac vein of left lower extremity     May-Thurner syndrome

## 2021-06-12 NOTE — PROGRESS NOTES
Patient to stop her Elequis in the beginning of November 2017. Have hypercoagulation blood work done couple weeks later. F/u with me 8-10 days later.

## 2021-06-12 NOTE — PROGRESS NOTES
Office Visit - Follow up    Maggie Nova   72 y.o. female    Date of Visit: 8/11/2017    Chief Complaint   Patient presents with     Hypertension     Follow-up     dvt left lower extrem.       Subjective: Fracture right femur.  History of DVT left lower extremity and hypertension.    Surgery done at the [facility] on July 26, 2017 for peroneal entrapment with footdrop right lower extremity after significant right femur fracture with injury to the sciatic nerve.    The patient had nerve impingement syndrome with release now near fibular head.  Nerve entrapment syndrome and syndrome diagnosed and treated by Dr. TANNER rodgers [facility] surgery as noted above.    No blood in stool or urine.  No chest pain shortness of breath no palpitations.  On March 28, 2016 was initial hip fracture that was severe.  The patient was also had deep vein thrombosis left lower extremity on Eliquis therapy diagnosed and treated at [facility] May 3, 2017.  The patient anticipates coming off Eliquis therapy soon.    Mammogram negative February 8, 2017 no blood in stool or urine no chest pain shortness of breath no exertional syncope or palpitations noted.    ROS: A comprehensive review of systems was performed and was otherwise negative    Medications:  Prior to Admission medications    Medication Sig Start Date End Date Taking? Authorizing Provider   apixaban (ELIQUIS) 5 mg Tab tablet Take 1 tablet (5 mg total) by mouth 2 (two) times a day. 5/12/17  Yes Burton Alford MD   atenolol (TENORMIN) 25 MG tablet Take 25 mg by mouth daily.   Yes PROVIDER, HISTORICAL   cholecalciferol, vitamin D3, 1,000 unit tablet Take 2,000 Units by mouth every evening.    Yes PROVIDER, HISTORICAL   levothyroxine (SYNTHROID, LEVOTHROID) 75 MCG tablet Take 75 mcg by mouth Daily at 6:00 am. SANJEEV per PMD   Yes PROVIDER, HISTORICAL   chlorhexidine (PERIDEX) 0.12 % solution  7/7/17   PROVIDER, HISTORICAL       Allergies:   Allergies    Allergen Reactions     Ibuprofen Other (See Comments)     Stomach ache       Immunizations:   Immunization History   Administered Date(s) Administered     DT (pediatric) 01/10/2005     Td, historic 01/10/2005       Exam Chest clear to auscultation and percussion.  Heart tones regular rhythm without murmur rub or gallop.  Abdomen soft nontender no organomegaly.  No peritoneal signs.  Extremities free of edema cyanosis or clubbing.  Neck veins nondistended no thyromegaly or scleral icterus noted, carotids full.  Skin warm and dry easily conversant good spirited.  Normal intelligence.  Neurologically intact no gross localizing findings.  Right leg surgery noted around the peroneal head laterally.  The patient uses a cane for ambulation there is swelling noted in and around the light right leg distally to the calf nothing to suggest deep vein thrombosis here nor on the left side.  Prior deep vein thrombosis left lower extremity noted on Eliquis therapy at this time.    Assessment and Plan  Right femur fracture with significant foot drop and recent surgical intervention around peroneal nerve head fibular head for entrapment syndrome.  [facility] Dr. TANNER Gomez presiding.    Deep vein thrombosis left lower extremity.    On Eliquis therapy well-tolerated no bleeding.    Hypertension with improved control 136/68 right arm sitting.    Overweight BMI 27+ discussed.    Hypothyroidism on thyroid supplement needs recheck TSH 4 months time with office visit.    Time: total time spent with the patient was 25 minutes of which >50% was spent in counseling and coordination of care    The following high BMI interventions were performed this visit: encouragement to exercise    Burton Alford MD    Patient Active Problem List   Diagnosis     Hemorrhoids     Essential Hypertension     Osteoarthritis Of The Knee     Hypothyroidism     Right Femoral neck fracture     Foot drop, right     DVT of axillary vein,  acute left     Lactic acidosis     Other elevated white blood cell (WBC) count     Hypokalemia     DVT (deep vein thrombosis) in pregnancy     Acute deep vein thrombosis (DVT) of iliac vein of left lower extremity     May-Thurner syndrome

## 2021-06-14 NOTE — PROGRESS NOTES
[facility] Cancer Care Progress Note    Patient: Maggie Nova  MRN: [id number]  Date of Service: 11/15/2017        Reason for visit      1. Acute deep vein thrombosis (DVT) of femoral vein of left lower extremity        Assessment     1.  DVT left common system iliac vein.   Blood work suggest that she may have antiphospholipid antibody syndrome.  2.  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  3.  Status post bare-metal stent deployment in the left common iliac vein.  4.  Status post hip surgery on the right leg in March 2016.  5.  Foot drop after the hip surgery on the right side.  6.  Other medical problems which are stable.      Plan     1.  At this time I would recommend that she continue on blood thinning for at least 6 more months.  2.  Follow-up with me in 6 months with repeat testing for lupus anticoagulant and diluted Robin viper venom testing.  3.  Continue with good diet and exercise.  4.  Advised to call if she has any new problem including bleeding etc.  5.  Follow-up with her orthopedic/neurology for improvement of the right foot drop.    Clinical stage      No matching staging information was found for the patient.    History     Maggie Nova is a very pleasant 72 y.o. old female  who was admitted on 3 May 2017 with painful left lower extremity.  The pain started a few hours before the patient presented to the emergency room.  This was associated with some purplish discoloration of the leg as well.  Subsequent that patient came to the emergency room.  Ultrasound showed DVT involving popliteal as well as femoral vein.  Due to the severity of her symptoms she was seen by interventional radiology who felt that she could benefit from thrombectomy/thrombolysis.  She was seen by Dr. Farmer.  Venography showed that most of the obstruction to venous flow was present in the left common iliac vein area with some evidence that she may have some chronic scarring/blockage of  the left common iliac vein.  She did have stenting done of the left common iliac vein with a bare-metal stent and that resulted in very brisk resolution of symptoms especially the color of the leg and also the pain.     She has no prior history of any DVT.  In the preceding 3-4 months she had no trauma to the left leg.  No prolonged overseas travel etc.  She has no habits which will predispose her to thrombo-embolism.  Interestingly enough she actually had surgery on her right hip area after which she states that she developed a foot drop.  So technically she is at a slightly higher risk of developing a DVT in her right leg than in the left leg.  Some hypercoagulable workup has been started.  However she has no family history of it.     She was initially on heparin drip and then switch her to oral anticoagulants.  Comes in today to discuss her hypercoagulable workup.    She is also undergone surgery to help with her right foot drop at [facility].  It is still not showing much improvement however.      Past Medical History     Past Medical History:   Diagnosis Date     Hypertension      Hypothyroidism        Review of Systems   Constitutional  Constitutional (WDL): Exceptions to WDL  Fatigue: Fatigue relieved by rest  Weight Gain: 5 - <10% from baseline (up 8 lbs)  Neurosensory  Neurosensory (WDL): Exceptions to WDL  Peripheral Motor Neuropathy: Asymptomatic, clinical or diagnostic observations only, intervention not indicated  Ataxia: Asymptomatic, clinical or diagnostic observations only, intervention not indicated (using a cane)  Peripheral Sensory Neuropathy: Moderate symptoms, limiting instrumental ADL (rt foot and leg drop foot wearing a brace)  Cardiovascular  Cardiovascular (WDL): Exceptions to WDL  Palpitations: Definition: A disorder characterized by inflammation of the muscle tissue of the heart. (flutters per patient)  Edema: Yes  Edema Limbs: 5 - 10% inter-limb discrepancy in volume or circumference at  point of greatest visible difference, swelling or obscuration of anatomic architecture on close inspection (left leg)  Superficial thrombophlebitis: Present (hx of left leg)  Pulmonary  Respiratory (WDL): Within Defined Limits  Gastrointestinal  Gastrointestinal (WDL): Exceptions to WDL  Dry Mouth: Symptomatic (e.g., dry or thick saliva) without significant dietary alteration, unstimulated saliva flow >0.2 ml/min  Genitourinary  Genitourinary (WDL): All genitourinary elements are within defined limits  Integumentary  Integumentary (WDL): All integumentary elements are within defined limits  Patient Coping  Patient Coping: Accepting  Accompanied by  Accompanied by: Alone    ECOG performance status and Distress Assessment      ECOG Performance:    ECOG Performance Status: 1    Distress Assessment  Distress Assessment Score: No distress:     Pain Status  Currently in Pain: No/denies (occ rt toe)        Vital Signs     Vitals:    11/15/17 1212   BP: (!) 166/104   Pulse:    Temp:    SpO2:        Physical Exam     GENERAL: No acute distress. Cooperative in conversation.   HEENT: Pupils are equal, round and reactive. Oral mucosa is clean and intact. No ulcerations or mucositis noted. No bleeding noted.  RESP:Chest symmetric lungs are clear bilaterally per auscultation. Regular respiratory rate. No wheezes or rhonchi.  CV: Normal S1 S2 Regular, rate and rhythm. No murmurs.  ABD: Nondistended, soft, nontender. Positive bowel sounds. No organomegaly.   EXTREMITIES: No lower extremity edema.  She has a brace on her right leg due to the foot drop.  Surgical scars below and above the knee appeared to be healing well.  NEURO: Non- focal. Alert and oriented x3.  Cranial nerves appear intact.  PSYCH: Within normal limits. No depression or anxiety.  SKIN: Warm dry intact.    LYMPH NODES: Bilateral cervical, supraclavicular, axillary lymph node examination was done.  Negative for any palpable adenopathy.      Lab Results     Results  for orders placed or performed in visit on 11/08/17   Factor 8 Assay   Result Value Ref Range    Scan Result See Scanned Report    Antithrombin III Activity   Result Value Ref Range    Antithrombin III Activity 106 78 - 134 %   Lupus Anticoagulant   Result Value Ref Range    Lupus Anticoagulant Screen 72 (H) <=47 sec   Protein C Activity   Result Value Ref Range    Protein C Activity 136 76 - 137 %   Protein S Activity   Result Value Ref Range    Protein S Activity 119 (H) 62 - 102 %   Beta-2 Glycoprotein 1 Antibodies,IgG/IgM   Result Value Ref Range    Beta 2 GP1 Ab IgG <9.4 <15.0 (Negative) U/mL    Beta 2 GP1 Ab IgM <9.4 <15.0 (Negative) U/mL   Lupus Anticoagulant Confirmation   Result Value Ref Range    Lupus Anti Coag Confirmation 53 (H) <=40 seconds    LA Screen / LA Confirm Ratio 1.4 (H) <=1.2   APTT Inhibitor Screen   Result Value Ref Range    PTT 38 (H) 24 - 37 seconds    APTT Normal Plasma 27 24 - 37 seconds    APTT Correct(1:1Mix) 31 24 - 37 seconds   STA Clot LA Assay   Result Value Ref Range    STA Clot Diluted 60.4 0 - 63 sec    STA Clot Hexagonal 56.2 0 - 58 sec    STA Clot Difference 4.2 <=11.1 sec         Imaging Results     No results found.      Omar William MD

## 2021-06-15 PROBLEM — I87.1 MAY-THURNER SYNDROME: Status: ACTIVE | Noted: 2017-05-10

## 2021-06-15 NOTE — PROGRESS NOTES
Office Visit - Follow up    Maggie Nova   72 y.o. female    Date of Visit: 1/22/2018    Chief Complaint   Patient presents with     Leg Swelling     right ER follow up       Subjective: Edema right foot and ankle.  Injured her right foot and ankle where she had a previous foot drop after right hip fracture the right foot and ankle got caught on the rug she fell to the left side.  Edema developed over the dorsum of the right foot and right lower leg.  There is pain inferior to the medial malleolus on the right.  She had been seen in the [facility] emergency department and an ultrasound done of the right leg showed no evidence for deep vein thrombosis.    No blood in stool or urine denies chest pain or shortness of breath but medication list reviewed well-tolerated she notes some palpitations after being placed on metoprolol succinate 25 mg daily.    Mammogram allCLEAR February 8, 2017 colonoscopy dated April 21, 2000 and was negative.    ROS: A comprehensive review of systems was performed and was otherwise negative    Medications:  Prior to Admission medications    Medication Sig Start Date End Date Taking? Authorizing Provider   apixaban (ELIQUIS) 5 mg Tab tablet Take 1 tablet (5 mg total) by mouth 2 (two) times a day. 11/15/17  Yes Omar William MD   cholecalciferol, vitamin D3, 1,000 unit tablet Take 2,000 Units by mouth every evening.    Yes PROVIDER, HISTORICAL   levothyroxine (SYNTHROID, LEVOTHROID) 75 MCG tablet Take 75 mcg by mouth Daily at 6:00 am. SANJEEV per PMD   Yes PROVIDER, HISTORICAL   metoprolol succinate (TOPROL XL) 25 MG Take 1 tablet (25 mg total) by mouth daily. 9/19/17 9/19/18 Yes Burton Alford MD   chlorhexidine (PERIDEX) 0.12 % solution Apply 15 mL to the mouth or throat 2 (two) times a day.  7/7/17   PROVIDER, HISTORICAL       Allergies:   Allergies   Allergen Reactions     Ibuprofen Other (See Comments)     Stomach ache       Immunizations:   Immunization History   Administered  Date(s) Administered     DT (pediatric) 01/10/2005     Td,adult,historic,unspecified 01/10/2005       Exam Chest clear to auscultation and percussion.  Heart tones regular rhythm without murmur rub or gallop.  Abdomen soft nontender no organomegaly.  No peritoneal signs.  Extremities free of edema cyanosis or clubbing.  Neck veins nondistended no thyromegaly or scleral icterus noted, carotids full.  Skin warm and dry easily conversant good spirited.  Normal intelligence.  Neurologically intact no gross localizing findings.  Right leg casted or any splint of some sort difficult to examine edema noted.    Assessment and Plan  Edema.  Check x-ray of right foot and ankle.  After injury and fall left side with negative ultrasound right lower extremity for deep vein thrombosis emergency department [facility] recently.  Those reports reviewed.    History of foot drop after right hip fracture.    Ibuprofen allergy.    Hypertension with palpitations on metoprolol 25 mg daily metoprolol succinate.    Hypothyroidism on replacement therapy clinically euthyroid.    Overweight BMI 29+.  O2 sats today 98%.  See below.    Time: total time spent with the patient was 25 minutes of which >50% was spent in counseling and coordination of care    The following high BMI interventions were performed this visit: encouragement to exercise    Burton Alford MD    Patient Active Problem List   Diagnosis     Essential Hypertension     Osteoarthritis Of The Knee     Hypothyroidism     Right Femoral neck fracture     Foot drop, right     Lactic acidosis     Acute deep vein thrombosis (DVT) of iliac vein of left lower extremity     May-Thurner syndrome

## 2021-06-16 PROBLEM — R76.0 LUPUS ANTICOAGULANT POSITIVE: Status: ACTIVE | Noted: 2018-05-14

## 2021-06-18 NOTE — PROGRESS NOTES
[facility] Cancer Care Progress Note    Patient: Maggie Nova  MRN: [id number]  Date of Service: 5/14/2018        Reason for visit      1. Acute deep vein thrombosis (DVT) of iliac vein of left lower extremity    2. May-Thurner syndrome    3. Lupus anticoagulant positive    4. Acute deep vein thrombosis (DVT) of femoral vein of left lower extremity        Assessment     1.  DVT left common system iliac vein.   Blood work suggest that she still has positive lupus anticoagulant.  2.  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  3.  Status post bare-metal stent deployment in the left common iliac vein.  4.  Status post hip surgery on the right leg in March 2016.  5.  Foot drop after the hip surgery on the right side.  6.  Other medical problems which are stable.      Plan     1.  At this time I would recommend that she continue on blood thinning for at least 12 more months.  2.  Repeat testing for lupus anticoagulant and diluted Robin viper venom testing.  3.  Continue with good diet and exercise.  4.  Advised to call if she has any new problem including bleeding etc.    Clinical stage      No matching staging information was found for the patient.    History     Maggie Nova is a very pleasant 73 y.o. old female  who was admitted on 3 May 2017 with painful left lower extremity.  The pain started a few hours before the patient presented to the emergency room.  This was associated with some purplish discoloration of the leg as well.  Subsequent that patient came to the emergency room.  Ultrasound showed DVT involving popliteal as well as femoral vein.  Due to the severity of her symptoms she was seen by interventional radiology who felt that she could benefit from thrombectomy/thrombolysis.  She was seen by Dr. Farmer.  Venography showed that most of the obstruction to venous flow was present in the left common iliac vein area with some evidence that she may  have some chronic scarring/blockage of the left common iliac vein.  She did have stenting done of the left common iliac vein with a bare-metal stent and that resulted in very brisk resolution of symptoms especially the color of the leg and also the pain.     She has no prior history of any DVT.  In the 3-4 months prior to the diagnosis of DVT, she had no trauma to the left leg.  No prolonged overseas travel etc.  She has no habits which predisposed her to thrombo-embolism. Interestingly enough she actually had surgery on her right hip area after which she states that she developed a foot drop.  So technically she is at a slightly higher risk of developing a DVT in her right leg than in the left leg. However she has no family history of it. She is also undergone surgery to help with her right foot drop at [facility].  It is still not showing much improvement however.  Her lab testing revealed that she had positive lupus anticoagulant.     She was initially on heparin drip and then switch her to oral anticoagulants.  Currently she is on Eliquis.  Tolerating it well.  No new problems.  Comes in today to discuss her lab results.      Past Medical History     Past Medical History:   Diagnosis Date     Hypertension      Hypothyroidism        Review of Systems   Constitutional  Constitutional (WDL): Exceptions to WDL  Fatigue: Fatigue relieved by rest  Neurosensory  Neurosensory (WDL): Exceptions to WDL  Ataxia: Asymptomatic, clinical or diagnostic observations only, intervention not indicated (Wears a brace to Rt leg.)  Peripheral Sensory Neuropathy: Asymptomatic, loss of deep tendon reflexes or paresthesia  Cardiovascular  Cardiovascular (WDL): All cardiovascular elements are within defined limits  Pulmonary  Respiratory (WDL): Within Defined Limits  Gastrointestinal  Gastrointestinal (WDL): All gastrointestinal elements are within defined limits  Genitourinary  Genitourinary (WDL): All genitourinary elements are within  defined limits  Integumentary  Integumentary (WDL): All integumentary elements are within defined limits  Patient Coping  Patient Coping: Accepting  Accompanied by  Accompanied by: Alone    ECOG performance status and Distress Assessment      ECOG Performance:    ECOG Performance Status: 1    Distress Assessment  Distress Assessment Score: No distress:     Pain Status  Currently in Pain: No/denies        Vital Signs     Vitals:    05/14/18 1322   BP: 173/85   Pulse: 84   Temp: 98.1  F (36.7  C)   SpO2: 99%       Physical Exam     GENERAL: No acute distress. Cooperative in conversation.   HEENT: Pupils are equal, round and reactive. Oral mucosa is clean and intact. No ulcerations or mucositis noted. No bleeding noted.  RESP:Chest symmetric lungs are clear bilaterally per auscultation. Regular respiratory rate. No wheezes or rhonchi.  CV: Normal S1 S2 Regular, rate and rhythm. No murmurs.  ABD: Nondistended, soft, nontender. Positive bowel sounds. No organomegaly.   EXTREMITIES: No lower extremity edema.  She has a brace on her right leg due to the foot drop.    NEURO: Non- focal. Alert and oriented x3.  Cranial nerves appear intact.  PSYCH: Within normal limits. No depression or anxiety.  SKIN: Warm dry intact.    LYMPH NODES: Bilateral cervical, supraclavicular, axillary lymph node examination was done.  Negative for any palpable adenopathy.      Lab Results       Recent Results (from the past 240 hour(s))   Lupus Anticoagulant   Result Value Ref Range    Lupus Result Positive (!) NEG   HM1 (CBC with Diff)   Result Value Ref Range    WBC 5.1 4.0 - 11.0 thou/uL    RBC 4.50 3.80 - 5.40 mill/uL    Hemoglobin 13.4 12.0 - 16.0 g/dL    Hematocrit 40.7 35.0 - 47.0 %    MCV 90 80 - 100 fL    MCH 29.8 27.0 - 34.0 pg    MCHC 32.9 32.0 - 36.0 g/dL    RDW 13.2 11.0 - 14.5 %    Platelets 186 140 - 440 thou/uL    MPV 11.2 8.5 - 12.5 fL    Neutrophils % 67 50 - 70 %    Lymphocytes % 23 20 - 40 %    Monocytes % 8 2 - 10 %     Eosinophils % 2 0 - 6 %    Basophils % 1 0 - 2 %    Neutrophils Absolute 3.4 2.0 - 7.7 thou/uL    Lymphocytes Absolute 1.1 0.8 - 4.4 thou/uL    Monocytes Absolute 0.4 0.0 - 0.9 thou/uL    Eosinophils Absolute 0.1 0.0 - 0.4 thou/uL    Basophils Absolute 0.0 0.0 - 0.2 thou/uL         Imaging Results     No results found.      Omar William MD

## 2021-06-18 NOTE — PROGRESS NOTES
Assessment and Plan:   Annual wellness visit    1. Screen for colon cancer  Annual wellness visit.  Last colonoscopy done in April 21, 2000 and normal.  - Ambulatory referral for Colonoscopy    2. Routine general medical examination at a health care facility  Annual wellness visit  - [facility](CBC w/o Differential)  - Comprehensive Metabolic Panel  - Lipid Cascade  - Thyroid Stimulating Hormone (TSH)  - Urinalysis-UC if Indicated  - C-Reactive Protein(CRP)     The patient's current medical problems were reviewed.    I have had an Advance Directives discussion with the patient.  The following health maintenance schedule was reviewed with the patient and provided in printed form in the after visit summary:   Health Maintenance   Topic Date Due     ZOSTER VACCINE  01/31/2007     DXA SCAN  01/31/2012     TD 18+ HE  01/10/2015     COLONOSCOPY  04/21/2018     INFLUENZA VACCINE RULE BASED (Season Ended) 08/01/2018     FALL RISK ASSESSMENT  06/11/2019     MAMMOGRAM  02/12/2020     ADVANCE DIRECTIVES DISCUSSED WITH PATIENT  04/20/2022     PNEUMOCOCCAL POLYSACCHARIDE VACCINE AGE 65 AND OVER  Addressed     PNEUMOCOCCAL CONJUGATE VACCINE FOR ADULTS (PCV13 OR PREVNAR)  Addressed        Subjective:   Chief Complaint: Maggie Nova is an 73 y.o. female here for an Annual Wellness visit.   HPI: Annual wellness visit physical.  C-reactive protein will be checked per patient's request.  73-year-old female.    Pap pelvic rectal and breast checks done per Dr. Lori Dolan gynecologist not repeated.    Normal colonoscopy April 21, 2008 advised patient of repeat colonoscopy now.    Mammogram done February 12, 2018 normal.    Serious compound complex right hip fracture requiring right total hip arthroplasty with resultant dependent edema and foot drop right lower extremity persistent numbness.  No excess alcohol non-smoker.    Ibuprofen causes GI upset.    The patient's original surgeon Dr. Jose Juan Velarde was somewhat  orthopedist.    Steroid injection left greater trochanteric bursa at the [facility] department of orthopedics.    [place] tooth extraction.    Hypertension and hypothyroidism controlled.    Mother  81 lymphoma.    Father death uncertain parents were estranged.  One daughter well 2 grandchildren well.    Review of Systems:    Please see above.  The rest of the review of systems are negative for all systems.    Patient Care Team:  Burton Alford MD as PCP - General  Jose Juan Velarde MD as Physician (Orthopedic Surgery)  Kalin Green MD as Physician (Neurology)  Lori Dolan MD as Physician (Obstetrics and Gynecology)     Patient Active Problem List   Diagnosis     Essential Hypertension     Osteoarthritis Of The Knee     Hypothyroidism     Right Femoral neck fracture     Foot drop, right     Lactic acidosis     Acute deep vein thrombosis (DVT) of iliac vein of left lower extremity     May-Thurner syndrome     Lupus anticoagulant positive     Past Medical History:   Diagnosis Date     Hypertension      Hypothyroidism       Past Surgical History:   Procedure Laterality Date     PARTIAL HIP ARTHROPLASTY Right 3/28/2016    Procedure: RIGHT TOTAL HIP WITH OPEN REDUCTION INTERNAL FIXATION RIGHT FEMUR FRACTURE;  Surgeon: Jose Juan Velarde MD;  Location: [facility];  Service:      WISDOM TOOTH EXTRACTION        Family History   Problem Relation Age of Onset     Lymphoma Mother 80     Hypertension Mother      Cancer Mother      Cancer Maternal Aunt      Biliary     Lung cancer Maternal Uncle      Alcoholism Father      No Medical Problems Sister      Diabetes Brother      Hypertension Brother      Hypertension Sister      No Medical Problems Sister       Social History     Social History     Marital status:      Spouse name: N/A     Number of children: 1     Years of education: N/A     Occupational History     Not on file.     Social History Main Topics     Smoking status: Former Smoker      "Packs/day: 0.25     Quit date: 1/1/1976     Smokeless tobacco: Never Used      Comment: quit in her 30's     Alcohol use No     Drug use: No     Sexual activity: No     Other Topics Concern     Not on file     Social History Narrative    , one daughter who is a nurse at [employer].        Current Outpatient Prescriptions   Medication Sig Dispense Refill     apixaban (ELIQUIS) 5 mg Tab tablet Take 1 tablet (5 mg total) by mouth 2 (two) times a day. 180 tablet 4     cholecalciferol, vitamin D3, 1,000 unit tablet Take 2,000 Units by mouth every evening.        levothyroxine (SYNTHROID, LEVOTHROID) 75 MCG tablet Take 75 mcg by mouth Daily at 6:00 am. SANJEEV per PMD       metoprolol succinate (TOPROL XL) 25 MG Take 1 tablet (25 mg total) by mouth daily. 100 tablet 3     No current facility-administered medications for this visit.       Objective:   Vital Signs:   Visit Vitals     /72     Pulse 82     Ht 5' 6\" (1.676 m)     Wt 180 lb (81.6 kg)     SpO2 97%     BMI 29.05 kg/m2        VisionScreening:  No exam data present     PHYSICAL EXAM  Chest clear to auscultation and percussion.  Heart tones regular rhythm without murmur rub or gallop.  Abdomen soft nontender no organomegaly.  No peritoneal signs.  Extremities free of edema cyanosis or clubbing.  Neck veins nondistended no thyromegaly or scleral icterus noted, carotids full.  Skin warm and dry easily conversant good spirited.  Normal intelligence.  Neurologically intact no gross localizing findings.  Rest of exam negative in its entirety including negative skin lymph neuropsych HEENT back straight no severe spine tenderness good pulses noted in all 4 extremities the right foot has a brace and is wrapped difficult to examine HEENT negative no carotid bruits thyromegaly back straight no severe spine tenderness abdomen benign chest and heart negative.  Good distal pulses no carotid bruits.  No lymphadenopathy appreciated lymph bearing " areas.    Assessment Results 6/11/2018   Activities of Daily Living No help needed   Instrumental Activities of Daily Living No help needed   Get Up and Go Score Less than 12 seconds   Mini Cog Total Score 5   Some recent data might be hidden     A Mini-Cog score of 0-2 suggests the possibility of dementia, score of 3-5 suggests no dementia    Identified Health Risks:     She is at risk for lack of exercise and has been provided with information to increase physical activity for the benefit of her well-being.  Information regarding advance directives (living walker), including where she can download the appropriate form, was provided to the patient via the AVS.

## 2021-06-19 NOTE — PROGRESS NOTES
[facility]   Outpatient Consult Note    ASSESSMENT  Resolved cellulitis right foot  Tinea pedis  Because veins and likely venous insufficiency  Ingrown toenail, dystrophic  Nothing to suggest right hip infection , and nothing to add to management to prevent such infection      PLAN  No more antibiotics  Lamisil ointment OTC  Referred to podiatry  Thank you for asking us to assist with this patient      Alethea Kern MD  [facility]  On-Call ID provider: [phone], option: 9      _________________________________  HPI  Patient is a 73 years old female referred by her primary home she had seen on August 7 1 week ago.  She reported being in the emergency room August 4 with inflamed right big toe.  Her  was trimming her nails and this was followed by inflammatory signs and was diagnosed with cellulitis and started on cephalexin.  Looks like she had ingrown toenail.  She completed that course yesterday.  Her symptoms are essentially resolved.  No fevers no chills.  no pus.  She wears a boot for foot drop.  Otherwise gait is normal no joint or hip pains.    ROS  All systems reviewed, negative except for the above    Current Outpatient Prescriptions on File Prior to Visit   Medication Sig Dispense Refill     apixaban (ELIQUIS) 5 mg Tab tablet Take 1 tablet (5 mg total) by mouth 2 (two) times a day. 180 tablet 4     cephALEXin (KEFLEX) 250 mg/5 mL suspension SHAKE WELL AND TK 10ML PO QID FOR 10 DAYS  0     cholecalciferol, vitamin D3, 1,000 unit tablet Take 2,000 Units by mouth every evening.        levothyroxine (SYNTHROID, LEVOTHROID) 75 MCG tablet Take 75 mcg by mouth Daily at 6:00 am. SANJEEV per PMD       metoprolol succinate (TOPROL XL) 25 MG Take 1 tablet (25 mg total) by mouth daily. 100 tablet 3     ondansetron (ZOFRAN ODT) 4 MG disintegrating tablet Take 1 tablet (4 mg total) by mouth every 8 (eight) hours as needed. 20 tablet 0     No current  facility-administered medications on file prior to visit.        Allergies   Allergen Reactions     Ibuprofen Other (See Comments)     Stomach ache       Social History     Social History     Marital status:      Spouse name: N/A     Number of children: 1     Years of education: N/A     Occupational History     Not on file.     Social History Main Topics     Smoking status: Former Smoker     Packs/day: 0.25     Quit date: 1/1/1976     Smokeless tobacco: Never Used      Comment: quit in her 30's     Alcohol use No     Drug use: No     Sexual activity: No     Other Topics Concern     Not on file     Social History Narrative    , one daughter who is a nurse at [employer].         Family History   Problem Relation Age of Onset     Lymphoma Mother 80     Hypertension Mother      Cancer Mother      Cancer Maternal Aunt      Biliary     Lung cancer Maternal Uncle      Alcoholism Father      No Medical Problems Sister      Diabetes Brother      Hypertension Brother      Hypertension Sister      No Medical Problems Sister          PHYSICAL EXAM  There were no vitals taken for this visit.    General Appearance:  Alert, cooperative, no distress, appears stated age    Head:  Normocephalic, without obvious abnormality, atraumatic   Neck no stiffness or adenopathy  Eyes no conjunctivitis or icterus                      Extremities:   Right foot drop.  Varicose veins.  Stasis dermatitis.  Cracks between the toes.  Dystrophic right first toenail.  No tenderness no hotness no pus no redness   Skin:  Skin color, texture, turgor normal, no rashes or lesions    Neurologic:  Alert and oriented X 3, Moves all 4 extremities      DATA  Results for orders placed or performed in visit on 08/07/18   HM2(CBC w/o Differential)   Result Value Ref Range    WBC 5.5 4.0 - 11.0 thou/uL    RBC 4.31 3.80 - 5.40 mill/uL    Hemoglobin 12.8 12.0 - 16.0 g/dL    Hematocrit 38.8 35.0 - 47.0 %    MCV 90 80 - 100 fL    MCH 29.7 27.0 -  34.0 pg    MCHC 33.0 32.0 - 36.0 g/dL    RDW 11.9 11.0 - 14.5 %    Platelets 227 140 - 440 thou/uL    MPV 9.5 7.0 - 10.0 fL       RADIOLOGY  none

## 2021-06-19 NOTE — PROGRESS NOTES
Office Visit - Follow up    Maggie Nova   73 y.o. female    Date of Visit: 8/7/2018    Chief Complaint   Patient presents with     Cellulitis     right big toe  ER follow up       Subjective: Cellulitis.    Last Saturday, August 4, 2018.  Taken to the emergency room at [facility] by her  because of nausea vomiting groin pain and her right lower extremity was red.  The patient's  had done some nail trimming and made a cut in her great toe on the dorsal aspect this became red she had groin pain not necessarily a fever but chills and felt weak.  She was nauseated.  Cellulitis was diagnosed cephalexin was prescribed in the syrup as she had trouble taking the medication as a pill or capsule.    In the past she has had a right total hip arthroplasty after fracture.  Resultant postoperative foot drop has been complicating issue.    No blood in the stool or urine no chest pain or shortness of breath medication list reviewed generally well-tolerated.  Allergies include ibuprofen.  Other past health history significant for hypothyroidism and hypertension.  No target organ damage like myocardial infarction or stroke no history of chronic kidney disease or CHF or atrial fibrillation.    ROS: A comprehensive review of systems was performed and was otherwise negative    Medications:  Prior to Admission medications    Medication Sig Start Date End Date Taking? Authorizing Provider   apixaban (ELIQUIS) 5 mg Tab tablet Take 1 tablet (5 mg total) by mouth 2 (two) times a day. 5/14/18  Yes Omar William MD   cephALEXin (KEFLEX) 250 mg/5 mL suspension SHAKE WELL AND TK 10ML PO QID FOR 10 DAYS 8/4/18  Yes PROVIDER, HISTORICAL   levothyroxine (SYNTHROID, LEVOTHROID) 75 MCG tablet Take 75 mcg by mouth Daily at 6:00 am. SANJEEV per PMD   Yes PROVIDER, HISTORICAL   metoprolol succinate (TOPROL XL) 25 MG Take 1 tablet (25 mg total) by mouth daily. 9/19/17 9/19/18 Yes Burton Alford MD   cephalexin (KEFLEX) 500 MG capsule  Take 1 capsule (500 mg total) by mouth 4 (four) times a day for 10 days. 8/4/18 8/7/18 Yes Jose Walker PA-C   cholecalciferol, vitamin D3, 1,000 unit tablet Take 2,000 Units by mouth every evening.     PROVIDER, HISTORICAL   ondansetron (ZOFRAN ODT) 4 MG disintegrating tablet Take 1 tablet (4 mg total) by mouth every 8 (eight) hours as needed. 8/4/18   Jose Walker PA-C       Allergies:   Allergies   Allergen Reactions     Ibuprofen Other (See Comments)     Stomach ache       Immunizations:   Immunization History   Administered Date(s) Administered     DT (pediatric) 01/10/2005     Td,adult,historic,unspecified 01/10/2005       Exam Chest clear to auscultation and percussion.  Heart tones regular rhythm without murmur rub or gallop.  Abdomen soft nontender no organomegaly.  No peritoneal signs.  Extremities free of edema cyanosis or clubbing.  Neck veins nondistended no thyromegaly or scleral icterus noted, carotids full.  Skin warm and dry easily conversant good spirited.  Normal intelligence.  Neurologically intact no gross localizing findings.  Right great toe is inflamed swollen as it is at the base there is some redness over the anterior tibial surface medially.  There is no groin pain or swelling.  On the right side.  She does have a footdrop wears a brace.  Right lower extremity after right total hip arthroplasty for severe fracture.    Assessment and Plan  Cellulitis right lower extremity distally with systemic symptoms.  Bacteremia?.  Check hemogram today continue cephalexin syrup as originally ordered by emergency room physician Saturday last woodwinds August 4, 2018.  Infectious disease consult needed with history of right total hip arthroplasty.  Return to clinic 2 weeks time.    Hypertension and hypothyroidism.    Allergy ibuprofen.    Time: total time spent with the patient was 25 minutes of which >50% was spent in counseling and coordination of care    The following high BMI interventions  were performed this visit: encouragement to exercise    Burton Alford MD    Patient Active Problem List   Diagnosis     Essential Hypertension     Osteoarthritis Of The Knee     Hypothyroidism     Right Femoral neck fracture     Foot drop, right     Lactic acidosis     Acute deep vein thrombosis (DVT) of iliac vein of left lower extremity (H)     May-Thurner syndrome     Lupus anticoagulant positive

## 2021-06-20 NOTE — PROGRESS NOTES
Office Visit - Follow up    Maggie Nova   73 y.o. female    Date of Visit: 8/21/2018    Chief Complaint   Patient presents with     Cellulitis     follow up right lower exxtrem.     Hypothyroidism       Subjective: Hypertension with history of hypothyroidism.  And cellulitis right lower extremity.    Seen by infectious disease specialist Dr. Kern last laboratory test done Hui the left 2018.  Denies chest pain or shortness of breath no blood in stool or urine medication list reviewed well-tolerated normal effects.  Feels better toenail fell off.  Was advised to see podiatrist.    Mammogram allCLEAR February 12, 2018 colonoscopy done in 2008 June 21 or April 21, 2008 allCLEAR.  Medication list reviewed well-tolerated normal effects.  Ibuprofen allergy listed.    ROS: A comprehensive review of systems was performed and was otherwise negative    Medications:  Prior to Admission medications    Medication Sig Start Date End Date Taking? Authorizing Provider   apixaban (ELIQUIS) 5 mg Tab tablet Take 1 tablet (5 mg total) by mouth 2 (two) times a day. 5/14/18  Yes Omar William MD   levothyroxine (SYNTHROID, LEVOTHROID) 75 MCG tablet Take 75 mcg by mouth Daily at 6:00 am. SANJEEV per PMD   Yes PROVIDER, HISTORICAL   metoprolol succinate (TOPROL XL) 25 MG Take 1 tablet (25 mg total) by mouth daily. 9/19/17 9/19/18 Yes Burton Alford MD   cholecalciferol, vitamin D3, 1,000 unit tablet Take 2,000 Units by mouth every evening.     PROVIDER, HISTORICAL       Allergies:   Allergies   Allergen Reactions     Ibuprofen Other (See Comments)     Stomach ache       Immunizations:   Immunization History   Administered Date(s) Administered     DT (pediatric) 01/10/2005     Td,adult,historic,unspecified 01/10/2005       Exam Chest clear to auscultation and percussion.  Heart tones regular rhythm without murmur rub or gallop.  Abdomen soft nontender no organomegaly.  No peritoneal signs.  Extremities free of edema cyanosis or  clubbing.  Neck veins nondistended no thyromegaly or scleral icterus noted, carotids full.  Skin warm and dry easily conversant good spirited.  Normal intelligence.  Neurologically intact no gross localizing findings.  Brace on right lower extremity.    Assessment and Plan  Ibuprofen allergy with history of hypertension and hypothyroidism.  Cellulitis right lower extremity improved history of severe fracture with foot drop right hip right lower extremity.    Overweight BMI 28.    Pulse 75 O2 sats room air 98% blood pressure 120/74 same meds and cares return to clinic 3 months time for fasting office visit with labs to include TSH and lipid panel.  Meds and cares same.    Time: total time spent with the patient was 25 minutes of which >50% was spent in counseling and coordination of care    The following high BMI interventions were performed this visit: encouragement to exercise    Burton Alford MD    Patient Active Problem List   Diagnosis     Essential Hypertension     Osteoarthritis Of The Knee     Hypothyroidism     Right Femoral neck fracture     Foot drop, right     Lactic acidosis     Acute deep vein thrombosis (DVT) of iliac vein of left lower extremity (H)     May-Thurner syndrome     Lupus anticoagulant positive

## 2021-06-22 NOTE — PROGRESS NOTES
Office Visit - Follow up    Maggie Nova   73 y.o. female    Date of Visit: 12/21/2018    Chief Complaint   Patient presents with     Follow-up       Subjective: Hypertension with hypothyroidism on thyroid supplement.    Insurance changes once TSH level checked.    The patient had a deep vein thrombosis left lower extremity the patient has had serious right hip fracture the resultant foot drop on the right lower extremity distally.  The foot drop is no better.    The patient denies blood in stool or urine no cold or heat intolerance medication list reviewed well-tolerated normal effects.    Mammogram allCLEAR February 12, 2018 colonoscopy done showed a polyp on November 30, 2018 at [facility] but background diverticulosis was noted the pathology on the polyp was not yet back.    Ibuprofen listed as an allergy.  History of being lupus anticoagulant positive.  May Thurner syndrome also deep vein thrombosis iliac vein left lower extremity.    ROS: A comprehensive review of systems was performed and was otherwise negative    Medications:  Prior to Admission medications    Medication Sig Start Date End Date Taking? Authorizing Provider   apixaban (ELIQUIS) 5 mg Tab tablet Take 1 tablet (5 mg total) by mouth 2 (two) times a day. 5/14/18  Yes Omar William MD   cholecalciferol, vitamin D3, 1,000 unit tablet Take 2,000 Units by mouth every evening.    Yes PROVIDER, HISTORICAL   levothyroxine (SYNTHROID, LEVOTHROID) 75 MCG tablet Take 75 mcg by mouth Daily at 6:00 am. SANJEEV per PMD   Yes PROVIDER, HISTORICAL   metoprolol succinate (TOPROL-XL) 25 MG TAKE ONE TABLET BY MOUTH EVERY DAY. (REPLACES ATENOLOL) 10/23/18  Yes Burton Alford MD       Allergies:   Allergies   Allergen Reactions     Ibuprofen Other (See Comments)     Stomach ache       Immunizations:   Immunization History   Administered Date(s) Administered     DT (pediatric) 01/10/2005     Td,adult,historic,unspecified 01/10/2005       Exam Chest clear  to auscultation and percussion.  Heart tones regular rhythm without murmur rub or gallop.  Abdomen soft nontender no organomegaly.  No peritoneal signs.  Extremities free of edema cyanosis or clubbing.  Neck veins nondistended no thyromegaly or scleral icterus noted, carotids full.  Skin warm and dry easily conversant good spirited.  Normal intelligence.  Neurologically intact no gross localizing findings.    Recheck blood pressure 146/85 pulse 72 respiratory rate 18 unlabored not in acute distress discussed mild systolic hypertension noted.    Assessment and Plan  Hypothyroidism with mild systolic  hypertension and history of deep vein thrombosis left lower extremity.  On Eliquis 5 mg twice a day for the last 2 days per hematology Dr. William.  Continuation of same recommended.  Blood pressure shows mild elevation at 158/90.  May be a candidate for additional antihypertensive therapy including HCTZ and/or lisinopril.  Plus continuation of metoprolol same dose 25 mg daily pulse 82 or the latter could be increased.    Diverticulosis and isolated polyp with path pending see colonoscopy report November 30, 2018.    TSH level pending as linked to hypertension hypothyroidism continue thyroid medicine same.  Also continue Eliquis same as patient has lupus anticoagulant positivity and history of deep vein thrombosis extensive left lower extremity.    Time: total time spent with the patient was 25 minutes of which >50% was spent in counseling and coordination of care    The following high BMI interventions were performed this visit: encouragement to exercise    Burton Alford MD    Patient Active Problem List   Diagnosis     Essential Hypertension     Osteoarthritis Of The Knee     Hypothyroidism     Right Femoral neck fracture     Foot drop, right     Lactic acidosis     Acute deep vein thrombosis (DVT) of iliac vein of left lower extremity (H)     May-Thurner syndrome     Lupus anticoagulant positive

## 2021-06-27 ENCOUNTER — HEALTH MAINTENANCE LETTER (OUTPATIENT)
Age: 76
End: 2021-06-27

## 2021-07-03 NOTE — ADDENDUM NOTE
Addendum Note by Deborah Aly MLT at 6/12/2018  7:43 AM     Author: Deborah Aly MLT Service: -- Author Type:     Filed: 6/12/2018  7:43 AM Encounter Date: 6/11/2018 Status: Signed    : Deborah Aly MLT ()    Addended by: DEBORAH ALY on: 6/12/2018 07:43 AM        Modules accepted: Orders

## 2021-07-03 NOTE — ADDENDUM NOTE
Addendum Note by Shen Hernandez at 5/10/2018 12:51 PM     Author: Shen Hernandez Service: -- Author Type:     Filed: 5/10/2018 12:51 PM Encounter Date: 5/7/2018 Status: Signed    : Shen Hernandez ()    Addended by: SHEN HERNANDEZ on: 5/10/2018 12:51 PM        Modules accepted: Orders

## 2021-07-14 PROBLEM — O22.30 DVT (DEEP VEIN THROMBOSIS) IN PREGNANCY: Status: RESOLVED | Noted: 2017-05-03 | Resolved: 2017-11-15

## 2021-07-21 ENCOUNTER — RECORDS - HEALTHEAST (OUTPATIENT)
Dept: ADMINISTRATIVE | Facility: CLINIC | Age: 76
End: 2021-07-21

## 2021-07-26 ENCOUNTER — TRANSFERRED RECORDS (OUTPATIENT)
Dept: HEALTH INFORMATION MANAGEMENT | Facility: CLINIC | Age: 76
End: 2021-07-26

## 2021-08-24 ENCOUNTER — TRANSFERRED RECORDS (OUTPATIENT)
Dept: HEALTH INFORMATION MANAGEMENT | Facility: CLINIC | Age: 76
End: 2021-08-24

## 2021-10-16 ENCOUNTER — HEALTH MAINTENANCE LETTER (OUTPATIENT)
Age: 76
End: 2021-10-16

## 2021-10-22 ENCOUNTER — HOSPITAL ENCOUNTER (EMERGENCY)
Facility: CLINIC | Age: 76
Discharge: HOME | End: 2021-10-22
Attending: FAMILY MEDICINE | Admitting: EMERGENCY MEDICINE
Payer: COMMERCIAL

## 2021-10-22 ENCOUNTER — APPOINTMENT (OUTPATIENT)
Dept: RADIOLOGY | Facility: CLINIC | Age: 76
End: 2021-10-22
Attending: EMERGENCY MEDICINE
Payer: COMMERCIAL

## 2021-10-22 VITALS
WEIGHT: 174 LBS | RESPIRATION RATE: 16 BRPM | SYSTOLIC BLOOD PRESSURE: 156 MMHG | DIASTOLIC BLOOD PRESSURE: 81 MMHG | TEMPERATURE: 97.4 F | OXYGEN SATURATION: 94 % | HEIGHT: 67 IN | HEART RATE: 65 BPM | BODY MASS INDEX: 27.31 KG/M2

## 2021-10-22 DIAGNOSIS — S92.351A CLOSED FRACTURE OF BASE OF FIFTH METATARSAL BONE OF RIGHT FOOT: ICD-10-CM

## 2021-10-22 PROCEDURE — 28470 CLTX METATARSAL FX WO MNP EA: CPT | Mod: RT

## 2021-10-22 PROCEDURE — 99284 EMERGENCY DEPT VISIT MOD MDM: CPT | Mod: 25

## 2021-10-22 PROCEDURE — 73590 X-RAY EXAM OF LOWER LEG: CPT | Mod: RT

## 2021-10-22 PROCEDURE — 73630 X-RAY EXAM OF FOOT: CPT | Mod: RT

## 2021-10-22 ASSESSMENT — ENCOUNTER SYMPTOMS
JOINT SWELLING: 1
NUMBNESS: 1

## 2021-10-22 ASSESSMENT — MIFFLIN-ST. JEOR: SCORE: 1313.95

## 2021-10-22 NOTE — ED NOTES
Pt did not tolerate ambulation with crutches. Tolerated ambulation with cam boot and walker. Walker ordered for discharge.

## 2021-10-22 NOTE — ED PROVIDER NOTES
Emergency Department Encounter      NAME: Maggie Nova  AGE: 76 year old female  YOB: 1945  MRN: [id number]  EVALUATION DATE & TIME: 10/22/2021 12:24 AM    PCP: Laura Hunter    ED PROVIDER: Aaron Hummel M.D.      Chief Complaint   Patient presents with     Ankle Pain         FINAL IMPRESSION:  1. Closed fracture of base of fifth metatarsal bone of right foot        MEDICAL DECISION MAKIN:34 AM I met with the patient, obtained history, performed an initial exam, and discussed options and plan for diagnostics and treatment here in the ED.   Pertinent Labs & Imaging studies reviewed. (See chart for details)       This patient is a 76-year-old female with a history of DVT, hypertension and right leg numbness after a hip surgery.  She presents after stumbling and injuring her right foot.  Because of the right foot numbness the patient has a right foot drop which she uses a foot brace form.  This caught on the ground causing her to flex more bend her foot.  She says that she was unable to bear weight afterwards however she did not have a a lot of pain because of her chronic right leg numbness.  On exam she had some diffuse swelling but had tenderness when I squeeze her mid and distal right to be in fibula.  There was also some diffuse swelling over the right foot but no localized area of tenderness.  An x-ray was done and tibia-fibula which did not show any evidence of fracture.  An x-ray of the right foot showed a fracture at the base of the right fifth metatarsal.  I discussed this with the patient and we discussed the plan to treat this.  We also spoke about Beatty fractures and the possibility of a prolonged short leg cast or surgery to fix this.  For now we will get him immobilize her right foot in a walking boot and have her use crutches.  She will likely be nonweightbearing anyway because she has used these in the past and have been unable to walk with them.  She was comfortable  "with this plan and will call [facility] to arrange for a follow-up appointment.  Because of the numbness she did not want any pain medications as she had very minimal pain.    The importance of close follow up was discussed. We reviewed warning signs and symptoms, and I instructed Ms. Nova to return to the emergency department immediately if she develops any new or worsening symptoms. I provided additional verbal discharge instructions. Ms. Nova expressed understanding and agreement with this plan of care, her questions were answered, and she was discharged in stable condition.           MEDICATIONS GIVEN IN THE EMERGENCY:  Medications - No data to display    NEW PRESCRIPTIONS STARTED AT TODAY'S ER VISIT:  New Prescriptions    No medications on file          =================================================================    HPI    Patient information was obtained from: Patient    Use of : N/A        Maggie Nova is a 76 year old female with a past medical history of HTN, DVT, and Lupus, who presents by EMS for evaluation of ankle pain.     Patient reports she was ambulating and didn't lift her right leg high enough causing it to get caught on the ground and she hyperextended the foot. 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 She walks with a brace due to foot drop, which is what the patient caught on the ground. Patient notes diffuse lower leg, ankle, and foot swelling. She has been unable to bear weight since due to the increased \"numbness\". Patient denies additional medical concerns or complaints at this time.           REVIEW OF SYSTEMS   Review of Systems   Musculoskeletal: Positive for joint swelling (right ankle with associated \"numbing pain\").   Neurological: Positive for numbness (chronic, but exacerbated after the fall).   All other systems " reviewed and are negative.       PAST MEDICAL HISTORY:  Past Medical History:   Diagnosis Date     Hypertension      Hypothyroidism        PAST SURGICAL HISTORY:  Past Surgical History:   Procedure Laterality Date     IR EXTREMITY VENOGRAM LEFT  5/3/2017     PARTIAL HIP ARTHROPLASTY Right 3/28/2016    Procedure: RIGHT TOTAL HIP WITH OPEN REDUCTION INTERNAL FIXATION RIGHT FEMUR FRACTURE;  Surgeon: Jose Juan Velarde MD;  Location: [facility];  Service:      WISDOM TOOTH EXTRACTION         CURRENT MEDICATIONS:    No current facility-administered medications for this encounter.    Current Outpatient Medications:      apixaban (ELIQUIS) 5 mg Tab tablet, [APIXABAN (ELIQUIS) 5 MG TAB TABLET] Take 1 tablet (5 mg total) by mouth 2 (two) times a day., Disp: 180 tablet, Rfl: 4     cholecalciferol, vitamin D3, 1,000 unit tablet, [CHOLECALCIFEROL, VITAMIN D3, 1,000 UNIT TABLET] Take 2,000 Units by mouth every evening. , Disp: , Rfl:      levothyroxine (SYNTHROID, LEVOTHROID) 75 MCG tablet, [LEVOTHYROXINE (SYNTHROID, LEVOTHROID) 75 MCG TABLET] Take 75 mcg by mouth Daily at 6:00 am. SANJEEV per PMD, Disp: , Rfl:      metoprolol succinate (TOPROL-XL) 25 MG, [METOPROLOL SUCCINATE (TOPROL-XL) 25 MG] TAKE ONE TABLET BY MOUTH EVERY DAY. (REPLACES ATENOLOL), Disp: 90 tablet, Rfl: 3     SYNTHROID 75 mcg tablet, [SYNTHROID 75 MCG TABLET] TAKE ONE TABLET BY MOUTH EVERY DAY (MD PACE), Disp: 90 tablet, Rfl: 3    ALLERGIES:  Allergies   Allergen Reactions     Ibuprofen Other (See Comments)     Stomach ache       FAMILY HISTORY:  Family History   Problem Relation Age of Onset     Lymphoma Mother 80.00     Hypertension Mother      Cancer Mother      Cancer Maternal Aunt         Biliary     Lung Cancer Maternal Uncle      Alcoholism Father      No Known Problems Sister      Diabetes Brother      Hypertension Brother      Hypertension Sister      No Known Problems Sister        SOCIAL HISTORY:   Social History     Socioeconomic History      "Marital status:      Spouse name: Not on file     Number of children: 1     Years of education: Not on file     Highest education level: Not on file   Occupational History     Not on file   Tobacco Use     Smoking status: Former Smoker     Packs/day: 0.25     Quit date: 1976     Years since quittin.8     Smokeless tobacco: Never Used     Tobacco comment: quit in her 30's   Substance and Sexual Activity     Alcohol use: No     Drug use: No     Sexual activity: Never     Partners: Male   Other Topics Concern     Not on file   Social History Narrative    , one daughter who is a nurse at [employer].       Social Determinants of Health     Financial Resource Strain:      Difficulty of Paying Living Expenses:    Food Insecurity:      Worried About Running Out of Food in the Last Year:      Ran Out of Food in the Last Year:    Transportation Needs:      Lack of Transportation (Medical):      Lack of Transportation (Non-Medical):    Physical Activity:      Days of Exercise per Week:      Minutes of Exercise per Session:    Stress:      Feeling of Stress :    Social Connections:      Frequency of Communication with Friends and Family:      Frequency of Social Gatherings with Friends and Family:      Attends Buddhism Services:      Active Member of Clubs or Organizations:      Attends Club or Organization Meetings:      Marital Status:    Intimate Partner Violence:      Fear of Current or Ex-Partner:      Emotionally Abused:      Physically Abused:      Sexually Abused:        PHYSICAL EXAM:    Vitals: BP (!) 156/81   Pulse 65   Temp 97.4  F (36.3  C) (Oral)   Resp 16   Ht 1.689 m (5' 6.5\")   Wt 78.9 kg (174 lb)   SpO2 94%   BMI 27.66 kg/m     Constitutional: Well developed, well nourished. Comfortable appearing.  HENT: Normocephalic, atraumatic, mucous membranes moist, nose normal. Neck- Supple, gross ROM intact.   Eyes: Pupils mid-range, sclera white, no discharge  Respiratory: Clear " to auscultation bilaterally, no respiratory distress, no wheezing, speaks full sentences easily.  Cardiovascular: Normal heart rate, regular rhythm, no murmurs. No lower extremity edema, 2+ DP pulses.   GI: Soft, no tenderness to deep palpation in all quadrants, no masses.  Musculoskeletal: . No obvious deformity. Mild diffuse swelling of right ankle and foot. Mild tenderness over distal tibial, fibula and ankle.  Skin: Warm, dry, no rash.  Neurologic: Alert & oriented x 3, speech clear, moving all extremities spontaneously   Psychiatric: Affect normal, cooperative.     LAB:  All pertinent labs reviewed and interpreted.  Labs Ordered and Resulted from Time of ED Arrival Up to the Time of Departure from the ED - No data to display    RADIOLOGY:  XR Foot Right 3 Views   Final Result   IMPRESSION: Slightly displaced fracture at the base of the fifth metatarsal. No other acute fracture or dislocation. The bones are demineralized. Hallux valgus.      XR Tibia & Fibula Right 2 Views   Final Result   IMPRESSION: Slightly displaced fracture at the base of the fifth metatarsal. No other acute fracture or dislocation. The bones are demineralized. Hallux valgus.          PROCEDURES:   Procedures       I, Annie Segura, am serving as a scribe to document services personally performed by Dr. Aaron Hummel based on my observation and the provider's statements to me. I, Aaron Hummel M.D. attest that Annie Segura is acting in a scribe capacity, has observed my performance of the services and has documented them in accordance with my direction.      Aaron Hummel M.D.  Emergency Medicine  [facility] EMERGENCY ROOM  [address] [id number] [phone]  Dept: [phone]       Aaron Hummel MD  10/22/21 0146

## 2021-10-22 NOTE — ED TRIAGE NOTES
Arrives to ED via EMS from home with c/o R ankle pain. Pt reports twisted R ankle while ambulating. Has baseline N/T to RLE from previous nerve damage. R foot swollen, reports appears baseline swollen. +pedial pulse. Cap refill 3 seconds.

## 2022-05-23 ENCOUNTER — HOSPITAL ENCOUNTER (OUTPATIENT)
Dept: MAMMOGRAPHY | Facility: CLINIC | Age: 77
Discharge: HOME | End: 2022-05-23
Attending: INTERNAL MEDICINE | Admitting: INTERNAL MEDICINE
Payer: COMMERCIAL

## 2022-05-23 DIAGNOSIS — Z12.31 VISIT FOR SCREENING MAMMOGRAM: ICD-10-CM

## 2022-05-23 PROCEDURE — 77067 SCR MAMMO BI INCL CAD: CPT

## 2022-07-23 ENCOUNTER — HEALTH MAINTENANCE LETTER (OUTPATIENT)
Age: 77
End: 2022-07-23

## 2022-10-01 ENCOUNTER — HEALTH MAINTENANCE LETTER (OUTPATIENT)
Age: 77
End: 2022-10-01

## 2023-05-26 ENCOUNTER — HOSPITAL ENCOUNTER (OUTPATIENT)
Dept: MAMMOGRAPHY | Facility: CLINIC | Age: 78
Discharge: HOME | End: 2023-05-26
Attending: INTERNAL MEDICINE | Admitting: INTERNAL MEDICINE
Payer: COMMERCIAL

## 2023-05-26 DIAGNOSIS — Z12.31 VISIT FOR SCREENING MAMMOGRAM: ICD-10-CM

## 2023-05-26 PROCEDURE — 77067 SCR MAMMO BI INCL CAD: CPT

## 2023-08-06 ENCOUNTER — HEALTH MAINTENANCE LETTER (OUTPATIENT)
Age: 78
End: 2023-08-06

## 2024-03-20 ENCOUNTER — APPOINTMENT (OUTPATIENT)
Dept: RADIOLOGY | Facility: CLINIC | Age: 79
End: 2024-03-20
Attending: EMERGENCY MEDICINE
Payer: COMMERCIAL

## 2024-03-20 ENCOUNTER — HOSPITAL ENCOUNTER (EMERGENCY)
Facility: CLINIC | Age: 79
Discharge: HOME | End: 2024-03-20
Attending: EMERGENCY MEDICINE | Admitting: EMERGENCY MEDICINE
Payer: COMMERCIAL

## 2024-03-20 VITALS
TEMPERATURE: 97 F | BODY MASS INDEX: 28.61 KG/M2 | HEIGHT: 66 IN | WEIGHT: 178 LBS | HEART RATE: 78 BPM | RESPIRATION RATE: 16 BRPM | SYSTOLIC BLOOD PRESSURE: 177 MMHG | DIASTOLIC BLOOD PRESSURE: 78 MMHG | OXYGEN SATURATION: 97 %

## 2024-03-20 DIAGNOSIS — S99.921A FOOT INJURY, RIGHT, INITIAL ENCOUNTER: ICD-10-CM

## 2024-03-20 PROCEDURE — 73630 X-RAY EXAM OF FOOT: CPT | Mod: RT

## 2024-03-20 PROCEDURE — 73502 X-RAY EXAM HIP UNI 2-3 VIEWS: CPT

## 2024-03-20 PROCEDURE — 73610 X-RAY EXAM OF ANKLE: CPT | Mod: RT

## 2024-03-20 PROCEDURE — 73590 X-RAY EXAM OF LOWER LEG: CPT | Mod: RT

## 2024-03-20 PROCEDURE — 99283 EMERGENCY DEPT VISIT LOW MDM: CPT

## 2024-03-20 ASSESSMENT — COLUMBIA-SUICIDE SEVERITY RATING SCALE - C-SSRS
1. IN THE PAST MONTH, HAVE YOU WISHED YOU WERE DEAD OR WISHED YOU COULD GO TO SLEEP AND NOT WAKE UP?: NO
6. HAVE YOU EVER DONE ANYTHING, STARTED TO DO ANYTHING, OR PREPARED TO DO ANYTHING TO END YOUR LIFE?: NO
2. HAVE YOU ACTUALLY HAD ANY THOUGHTS OF KILLING YOURSELF IN THE PAST MONTH?: NO

## 2024-03-20 NOTE — DISCHARGE INSTRUCTIONS
No signs of any broken bones or dislocations  Continue Tylenol, ice or heat, elevation and rest  Follow-up close with your primary doctor, return if any acute worsening of symptoms

## 2024-03-20 NOTE — ED TRIAGE NOTES
Pt has chronic right leg problems and wears a brace, Pt states last night was in kitchen and her shoe got stuck on floor and she tripped and fell onto kitchen linoleum floor. Denies hitting head. Pt states twisted right foot and ankle. Pt has chronic right hip and leg pain. Pt c.o right ankle and right foot pain since fall. Denies blood thinners. Pt states Hx about 3 years ago tripped and broke about 3 bones in right foot as well. Pt states is having more right lwoer leg pain than usual as well

## 2024-03-20 NOTE — ED PROVIDER NOTES
EMERGENCY DEPARTMENT ENCOUNTER      NAME: Maggie Nova  AGE: 79 year old female  YOB: 1945  MRN: [id number]  EVALUATION DATE & TIME: No admission date for patient encounter.    PCP: Laura Hunter    ED PROVIDER: Karlee Gonzalez DO      Chief Complaint   Patient presents with    Fall    Ankle Pain    Foot Pain    Leg Pain         FINAL IMPRESSION:  1. Foot injury, right, initial encounter          ED COURSE & MEDICAL DECISION MAKING:    Pertinent Labs & Imaging studies reviewed. (See chart for details)  11:33 AM I met the patient and performed my initial interview and exam.  12:41 PM Reevaluated and updated the patient with findings. We discussed the plan for discharge and the patient is agreeable. Reviewed supportive cares, symptomatic treatment, outpatient follow up, and reasons to return to the Emergency Department. Patient to be discharged by ED RN.     79 year old female presents to the Emergency Department for evaluation of right foot and leg pain.  She has a history of chronic pain to the leg and decreased sensation.  She has a foot drop.  Twisted her foot last evening.  She has some tenderness to the foot and ankle as well as the distal tibia and her right lateral hip.  She is able to ambulate.  X-ray imaging without fracture or dislocation.  Discussed symptomatic care with patient and return precautions.  No hit to her head or need to image her head.  No presyncopal symptoms that would necessitate any further workup.    At the conclusion of the encounter I discussed the results of all of the tests and the disposition. The questions were answered. The patient or family acknowledged understanding and was agreeable with the care plan.     Medical Decision Making  Obtained supplemental history:Supplemental history obtained?: No  Reviewed external records: External records reviewed?: Documented in chart  Care impacted by chronic illness:Hyperlipidemia, Hypertension, and Other: Lupus  Care  significantly affected by social determinants of health:Access to Medical Care  Did you consider but not order tests?: Work up considered but not performed and documented in chart, if applicable  Did you interpret images independently?: Independent interpretation of ECG and images noted in documentation, when applicable.  Consultation discussion with other provider:Did you involve another provider (consultant, , pharmacy, etc.)?: No  Discharge. No recommendations on prescription strength medication(s). See documentation for any additional details.       MEDICATIONS GIVEN IN THE EMERGENCY:  Medications - No data to display    NEW PRESCRIPTIONS STARTED AT TODAY'S ER VISIT  Discharge Medication List as of 3/20/2024  1:43 PM             =================================================================    HPI    Patient information was obtained from: patient    Use of : N/A         Maggie Nova is a 79 year old female with a pertinent history of Partial Hip Arthroplasty (Right, 3/28/2016), HTN, HLD, who presents to this ED via walk-in with family for evaluation of fall.    Patient endorses a history of chronic right leg numbness and right hip pain since her right hip surgery in 2016. Last night, she was moving in the kitchen and her right foot caught on the linoleum floor. She felt her foot twist and heard a crack but landed on her left side. She denies head trauma or LOC. She is not anticoagulated. Since then, she endorses some right foot and ankle pain with increased swelling. She also has worsening tenderness over her right hip compared to baseline. She is still able to bear weight on the leg and walk if she has her brace on. She did take extra strength tylenol last night with relief. She denies associating knee pain. There were no other concerns/complaints at this time.      REVIEW OF SYSTEMS   Per HPI    PAST MEDICAL HISTORY:  Past Medical History:   Diagnosis Date    Hypertension     Hypothyroidism   "      PAST SURGICAL HISTORY:  Past Surgical History:   Procedure Laterality Date    IR EXTREMITY VENOGRAM LEFT  5/3/2017    PARTIAL HIP ARTHROPLASTY Right 3/28/2016    Procedure: RIGHT TOTAL HIP WITH OPEN REDUCTION INTERNAL FIXATION RIGHT FEMUR FRACTURE;  Surgeon: Jose Juan Velarde MD;  Location: [facility];  Service:     WISDOM TOOTH EXTRACTION             CURRENT MEDICATIONS:    apixaban (ELIQUIS) 5 mg Tab tablet  cholecalciferol, vitamin D3, 1,000 unit tablet  levothyroxine (SYNTHROID, LEVOTHROID) 75 MCG tablet  metoprolol succinate (TOPROL-XL) 25 MG  SYNTHROID 75 mcg tablet         ALLERGIES:  Allergies   Allergen Reactions    Cantaloupe Extract Allergy Skin Test Diarrhea and GI Disturbance    Ibuprofen Other (See Comments)     Stomach ache       FAMILY HISTORY:  Family History   Problem Relation Age of Onset    Lymphoma Mother 80.00    Hypertension Mother     Cancer Mother     Cancer Maternal Aunt         Biliary    Lung Cancer Maternal Uncle     Alcoholism Father     No Known Problems Sister     Diabetes Brother     Hypertension Brother     Hypertension Sister     No Known Problems Sister        SOCIAL HISTORY:   Social History     Socioeconomic History    Marital status:     Number of children: 1   Tobacco Use    Smoking status: Former     Packs/day: .25     Types: Cigarettes     Quit date: 1976     Years since quittin.2    Smokeless tobacco: Never    Tobacco comments:     quit in her 30's   Substance and Sexual Activity    Alcohol use: No    Drug use: No    Sexual activity: Never     Partners: Male   Social History Narrative    , one daughter who is a nurse at [employer].         VITALS:  BP (!) 177/78   Pulse 78   Temp 97  F (36.1  C) (Temporal)   Resp 16   Ht 1.676 m (5' 6\")   Wt 80.7 kg (178 lb)   SpO2 97%   BMI 28.73 kg/m      PHYSICAL EXAM    Physical Exam  Vitals and nursing note reviewed.   Constitutional:       General: She is not in acute distress.   "   Appearance: Normal appearance.   HENT:      Head: Normocephalic and atraumatic.   Eyes:      Pupils: Pupils are equal, round, and reactive to light.   Cardiovascular:      Rate and Rhythm: Normal rate and regular rhythm.   Pulmonary:      Effort: Pulmonary effort is normal.   Abdominal:      General: Abdomen is flat.   Musculoskeletal:         General: Normal range of motion.      Right hip: Tenderness present.      Right lower leg: Tenderness present.      Right ankle: Tenderness present.      Right foot: Normal capillary refill. Foot drop and tenderness present. No laceration. Normal pulse.   Skin:     General: Skin is warm and dry.   Neurological:      General: No focal deficit present.      Mental Status: She is alert.      Gait: Gait normal.           LAB:  All pertinent labs reviewed and interpreted.  Labs Ordered and Resulted from Time of ED Arrival to Time of ED Departure - No data to display    RADIOLOGY:  Reviewed all pertinent imaging. Please see official radiology report.  XR Ankle Right G/E 3 Views   Final Result   IMPRESSION: Normal joint spaces and alignment. No fracture.      XR Tibia & Fibula Right 2 Views   Final Result   IMPRESSION: The right tibia and fibula are negative for fracture. Ankle mortise is not included on the field-of-view and cannot be evaluated. Patella khari.      XR Pelvis and Hip Right 2 Views   Final Result   IMPRESSION: Postoperative changes of right total hip arthroplasty with cerclage wire fixation. The left hip is negative for fracture. Pelvis negative for fracture.      XR Foot Right G/E 3 Views   Final Result   IMPRESSION: Hallux valgus and bunion deformity with degenerative change first MTP joint. Plantar calcaneal spurring. Hammertoe deformities. No evidence for acute fracture. Mild demineralization. Slight soft tissue swelling over the dorsal aspect of the    foot.          Blanca GARBER, am serving as a scribe to document services personally performed by Dr. Desir  Carlos based on my observation and the provider's statements to me. I, Karlee Gonzalez, DO attest that Blanca Bari is acting in a scribe capacity, has observed my performance of the services and has documented them in accordance with my direction.    Karlee Gonzalez DO  Emergency Medicine  [facility] EMERGENCY ROOM  [address] [id number]  [phone]  Dept: [phone]       Karlee Gonzalez DO  03/20/24 1558

## 2024-05-28 ENCOUNTER — HOSPITAL ENCOUNTER (OUTPATIENT)
Dept: MAMMOGRAPHY | Facility: CLINIC | Age: 79
Discharge: HOME | End: 2024-05-28
Attending: INTERNAL MEDICINE | Admitting: INTERNAL MEDICINE
Payer: COMMERCIAL

## 2024-05-28 DIAGNOSIS — Z12.31 VISIT FOR SCREENING MAMMOGRAM: ICD-10-CM

## 2024-05-28 PROCEDURE — 77063 BREAST TOMOSYNTHESIS BI: CPT

## 2024-09-29 ENCOUNTER — HEALTH MAINTENANCE LETTER (OUTPATIENT)
Age: 79
End: 2024-09-29

## 2024-12-31 ENCOUNTER — APPOINTMENT (OUTPATIENT)
Dept: RADIOLOGY | Facility: CLINIC | Age: 79
End: 2024-12-31
Attending: EMERGENCY MEDICINE
Payer: COMMERCIAL

## 2024-12-31 ENCOUNTER — HOSPITAL ENCOUNTER (EMERGENCY)
Facility: CLINIC | Age: 79
Discharge: HOME | End: 2024-12-31
Attending: EMERGENCY MEDICINE
Payer: COMMERCIAL

## 2024-12-31 VITALS
OXYGEN SATURATION: 95 % | HEIGHT: 66 IN | SYSTOLIC BLOOD PRESSURE: 205 MMHG | WEIGHT: 174 LBS | TEMPERATURE: 97.7 F | DIASTOLIC BLOOD PRESSURE: 98 MMHG | HEART RATE: 85 BPM | BODY MASS INDEX: 27.97 KG/M2 | RESPIRATION RATE: 18 BRPM

## 2024-12-31 DIAGNOSIS — K29.00 ACUTE GASTRITIS WITHOUT HEMORRHAGE, UNSPECIFIED GASTRITIS TYPE: ICD-10-CM

## 2024-12-31 DIAGNOSIS — R07.9 CHEST PAIN, UNSPECIFIED TYPE: ICD-10-CM

## 2024-12-31 LAB
ALBUMIN SERPL BCG-MCNC: 4 G/DL (ref 3.5–5.2)
ALP SERPL-CCNC: 83 U/L (ref 40–150)
ALT SERPL W P-5'-P-CCNC: 22 U/L (ref 0–50)
ANION GAP SERPL CALCULATED.3IONS-SCNC: 9 MMOL/L (ref 7–15)
AST SERPL W P-5'-P-CCNC: 33 U/L (ref 0–45)
ATRIAL RATE - MUSE: 73 BPM
BASOPHILS # BLD AUTO: 0 10E3/UL (ref 0–0.2)
BASOPHILS NFR BLD AUTO: 1 %
BILIRUB DIRECT SERPL-MCNC: <0.2 MG/DL (ref 0–0.3)
BILIRUB SERPL-MCNC: 0.2 MG/DL
BUN SERPL-MCNC: 20.6 MG/DL (ref 8–23)
CALCIUM SERPL-MCNC: 8.9 MG/DL (ref 8.8–10.4)
CHLORIDE SERPL-SCNC: 109 MMOL/L (ref 98–107)
CREAT SERPL-MCNC: 0.69 MG/DL (ref 0.51–0.95)
DIASTOLIC BLOOD PRESSURE - MUSE: NORMAL MMHG
EGFRCR SERPLBLD CKD-EPI 2021: 89 ML/MIN/1.73M2
EOSINOPHIL # BLD AUTO: 0.1 10E3/UL (ref 0–0.7)
EOSINOPHIL NFR BLD AUTO: 4 %
ERYTHROCYTE [DISTWIDTH] IN BLOOD BY AUTOMATED COUNT: 12.8 % (ref 10–15)
FLUAV RNA SPEC QL NAA+PROBE: NEGATIVE
FLUBV RNA RESP QL NAA+PROBE: NEGATIVE
GLUCOSE SERPL-MCNC: 127 MG/DL (ref 70–99)
HCO3 SERPL-SCNC: 24 MMOL/L (ref 22–29)
HCT VFR BLD AUTO: 40.8 % (ref 35–47)
HGB BLD-MCNC: 13.4 G/DL (ref 11.7–15.7)
IMM GRANULOCYTES # BLD: 0 10E3/UL
IMM GRANULOCYTES NFR BLD: 0 %
INTERPRETATION ECG - MUSE: NORMAL
LIPASE SERPL-CCNC: 47 U/L (ref 13–60)
LYMPHOCYTES # BLD AUTO: 1.3 10E3/UL (ref 0.8–5.3)
LYMPHOCYTES NFR BLD AUTO: 35 %
MCH RBC QN AUTO: 30.1 PG (ref 26.5–33)
MCHC RBC AUTO-ENTMCNC: 32.8 G/DL (ref 31.5–36.5)
MCV RBC AUTO: 92 FL (ref 78–100)
MONOCYTES # BLD AUTO: 0.3 10E3/UL (ref 0–1.3)
MONOCYTES NFR BLD AUTO: 9 %
NEUTROPHILS # BLD AUTO: 1.9 10E3/UL (ref 1.6–8.3)
NEUTROPHILS NFR BLD AUTO: 52 %
NRBC # BLD AUTO: 0 10E3/UL
NRBC BLD AUTO-RTO: 0 /100
P AXIS - MUSE: 70 DEGREES
PLATELET # BLD AUTO: 169 10E3/UL (ref 150–450)
POTASSIUM SERPL-SCNC: 3.8 MMOL/L (ref 3.4–5.3)
PR INTERVAL - MUSE: 196 MS
PROT SERPL-MCNC: 6.6 G/DL (ref 6.4–8.3)
QRS DURATION - MUSE: 80 MS
QT - MUSE: 388 MS
QTC - MUSE: 427 MS
R AXIS - MUSE: 54 DEGREES
RBC # BLD AUTO: 4.45 10E6/UL (ref 3.8–5.2)
RSV RNA SPEC NAA+PROBE: NEGATIVE
SARS-COV-2 RNA RESP QL NAA+PROBE: NEGATIVE
SODIUM SERPL-SCNC: 142 MMOL/L (ref 135–145)
SYSTOLIC BLOOD PRESSURE - MUSE: NORMAL MMHG
T AXIS - MUSE: 68 DEGREES
TROPONIN T SERPL HS-MCNC: 8 NG/L
TROPONIN T SERPL HS-MCNC: 9 NG/L
VENTRICULAR RATE- MUSE: 73 BPM
WBC # BLD AUTO: 3.8 10E3/UL (ref 4–11)

## 2024-12-31 PROCEDURE — 87637 SARSCOV2&INF A&B&RSV AMP PRB: CPT | Performed by: EMERGENCY MEDICINE

## 2024-12-31 PROCEDURE — 71046 X-RAY EXAM CHEST 2 VIEWS: CPT

## 2024-12-31 PROCEDURE — 85004 AUTOMATED DIFF WBC COUNT: CPT | Performed by: EMERGENCY MEDICINE

## 2024-12-31 PROCEDURE — 93005 ELECTROCARDIOGRAM TRACING: CPT | Performed by: EMERGENCY MEDICINE

## 2024-12-31 PROCEDURE — 84460 ALANINE AMINO (ALT) (SGPT): CPT | Performed by: EMERGENCY MEDICINE

## 2024-12-31 PROCEDURE — 99285 EMERGENCY DEPT VISIT HI MDM: CPT | Mod: 25 | Performed by: EMERGENCY MEDICINE

## 2024-12-31 PROCEDURE — 82310 ASSAY OF CALCIUM: CPT | Performed by: EMERGENCY MEDICINE

## 2024-12-31 PROCEDURE — 36415 COLL VENOUS BLD VENIPUNCTURE: CPT | Performed by: EMERGENCY MEDICINE

## 2024-12-31 PROCEDURE — 250N000013 HC RX MED GY IP 250 OP 250 PS 637: Performed by: EMERGENCY MEDICINE

## 2024-12-31 PROCEDURE — 83690 ASSAY OF LIPASE: CPT | Performed by: EMERGENCY MEDICINE

## 2024-12-31 PROCEDURE — 85014 HEMATOCRIT: CPT | Performed by: EMERGENCY MEDICINE

## 2024-12-31 PROCEDURE — 80048 BASIC METABOLIC PNL TOTAL CA: CPT | Performed by: EMERGENCY MEDICINE

## 2024-12-31 PROCEDURE — 82248 BILIRUBIN DIRECT: CPT | Performed by: EMERGENCY MEDICINE

## 2024-12-31 PROCEDURE — 84484 ASSAY OF TROPONIN QUANT: CPT | Performed by: EMERGENCY MEDICINE

## 2024-12-31 RX ORDER — MAGNESIUM HYDROXIDE/ALUMINUM HYDROXICE/SIMETHICONE 120; 1200; 1200 MG/30ML; MG/30ML; MG/30ML
15 SUSPENSION ORAL ONCE
Status: COMPLETED | OUTPATIENT
Start: 2024-12-31 | End: 2024-12-31

## 2024-12-31 RX ADMIN — ALUMINUM HYDROXIDE, MAGNESIUM HYDROXIDE, AND SIMETHICONE 15 ML: 1200; 120; 1200 SUSPENSION ORAL at 09:25

## 2024-12-31 NOTE — DISCHARGE INSTRUCTIONS
As discussed, your testing is all reassuring here today  No signs of COVID, influenza, pneumonia  No signs of heart attack  Likely your symptoms resolved with a GI cocktail today  I would recommend starting Pepcid or Prilosec at home  Avoid chocolate, excessive eating before bedtime.  May have some sort of viral infection, rest, fluid hydrate, Tylenol  Return if any acute worsening symptoms

## 2024-12-31 NOTE — ED TRIAGE NOTES
Pt arrived from home via EMS. Pt woke at 0530 feeling heartburn with burning sensation up into her throat. Pt also has some congestion and feels SOB this AM.     Triage Assessment (Adult)       Row Name 12/31/24 0700          Triage Assessment    Airway WDL WDL        Respiratory WDL    Respiratory WDL X;rhythm/pattern     Rhythm/Pattern, Respiratory shortness of breath        Skin Circulation/Temperature WDL    Skin Circulation/Temperature WDL WDL        Cardiac WDL    Cardiac WDL WDL        Peripheral/Neurovascular WDL    Peripheral Neurovascular WDL WDL        Cognitive/Neuro/Behavioral WDL    Cognitive/Neuro/Behavioral WDL WDL

## 2024-12-31 NOTE — ED PROVIDER NOTES
EMERGENCY DEPARTMENT ENCOUNTER      NAME: Maggie Nova  AGE: 79 year old female  YOB: 1945  MRN: [id number]  EVALUATION DATE & TIME: No admission date for patient encounter.    PCP: Laura Hunter    ED PROVIDER: Karlee Gonzalez DO      Chief Complaint   Patient presents with    Shortness of Breath    Nasal Congestion    Heartburn         FINAL IMPRESSION:  1. Chest pain, unspecified type    2. Acute gastritis without hemorrhage, unspecified gastritis type          ED COURSE & MEDICAL DECISION MAKING:    Pertinent Labs & Imaging studies reviewed. (See chart for details)  7:00 AM I met the patient and performed my initial interview and exam.  10:00 AM I updated the patient.   10:16 AM We discussed the plan for discharge and the patient is agreeable. Reviewed supportive cares, symptomatic treatment, outpatient follow up, and reasons to return to the Emergency Department. Patient to be discharged.  Repeat /91, admits did not take medications this morning    79 year old female presents to the Emergency Department for evaluation of a burning chest pain, shortness of breath.  Symptoms started this morning around 0530.  Notes some congestion.  Had some chocolate last night and thought this may have contributed heartburn.  Took Tums without relief.  Recent COVID exposure.  No fevers.  She is nontoxic-appearing.  Differential includes but not limited to gastritis, ACS, pneumonia, viral infection, pericarditis among others.  Oropharynx is clear.  Lung sounds are clear.  Not consistent with PE, dissection or AAA.  EKG without evidence of arrhythmia or ischemia.  Mild leukopenia on labs.  No significant electrolyte abnormalities.  No acute elevation LFTs, no significant abdominal pain to suggest cholelithiasis or cholecystitis.  Lipase is normal.  Initial high-sensitivity troponin is 9.  Delta is 8.  CXR without acute abnormality.  Symptoms improved with GI cocktail.  Discussed likely gastritis, viral  syndrome.  Discussed symptomatic care for home.  Recommended that she start H2 blocker and discussed other symptomatic therapies.  Blood pressure improved here, did not take her blood pressure medication prior to arrival.  Discussed other symptomatic care and other return precautions.    At the conclusion of the encounter I discussed the results of all of the tests and the disposition. The questions were answered. The patient or family acknowledged understanding and was agreeable with the care plan.     Medical Decision Making  Obtained supplemental history:Supplemental history obtained?: No  Reviewed external records: External records reviewed?: No  Care impacted by chronic illness:Documented in Chart, Hypertension, and Other: Hypothyroidism  Did you consider but not order tests?: Work up considered but not performed and documented in chart, if applicable  Did you interpret images independently?: Independent interpretation of ECG and images noted in documentation, when applicable.  Consultation discussion with other provider:Did you involve another provider (consultant, , pharmacy, etc.)?: No  Discharge. No recommendations on prescription strength medication(s). See documentation for any additional details.    MIPS: Not Applicable    MEDICATIONS GIVEN IN THE EMERGENCY:  Medications   alum & mag hydroxide-simethicone (MAALOX) suspension 15 mL (15 mLs Oral $Given 12/31/24 0925)       NEW PRESCRIPTIONS STARTED AT TODAY'S ER VISIT  New Prescriptions    No medications on file          =================================================================    HPI    Patient information was obtained from: Patient    Use of : N/A         Maggie Nova is a 79 year old female with a pertinent history of hyperlipidemia, hypertension, and hypothyroidism who presents to this ED by EMS for evaluation of shortness of breath, nasal congestion, and heartburn.     At 5:30 AM, patient woke up with a sensation of heartburn  and burning in her throat. She then started experiencing congestion and shortness of breath, prompting her visit to the ED.     Patient mentions before she went to bed, she ate some chocolate. She also reports she was recently in contact with her daughter who tested positive for COVID.     Denies any fevers, vomiting, diarrhea, pharyngitis, or abdominal pain. Patient is otherwise in her normal state of health with no other concerns.       REVIEW OF SYSTEMS   Per HPI    PAST MEDICAL HISTORY:  Past Medical History:   Diagnosis Date    Hypertension     Hypothyroidism        PAST SURGICAL HISTORY:  Past Surgical History:   Procedure Laterality Date    IR EXTREMITY VENOGRAM LEFT  5/3/2017    PARTIAL HIP ARTHROPLASTY Right 3/28/2016    Procedure: RIGHT TOTAL HIP WITH OPEN REDUCTION INTERNAL FIXATION RIGHT FEMUR FRACTURE;  Surgeon: Jose Juan Velarde MD;  Location: [facility];  Service:     WISDOM TOOTH EXTRACTION             CURRENT MEDICATIONS:    apixaban (ELIQUIS) 5 mg Tab tablet  cholecalciferol, vitamin D3, 1,000 unit tablet  levothyroxine (SYNTHROID, LEVOTHROID) 75 MCG tablet  metoprolol succinate (TOPROL-XL) 25 MG  SYNTHROID 75 mcg tablet         ALLERGIES:  Allergies   Allergen Reactions    Cantaloupe Extract Allergy Skin Test Diarrhea and GI Disturbance    Ibuprofen Other (See Comments)     Stomach ache       FAMILY HISTORY:  Family History   Problem Relation Age of Onset    Lymphoma Mother 80.00    Hypertension Mother     Cancer Mother     Cancer Maternal Aunt         Biliary    Lung Cancer Maternal Uncle     Alcoholism Father     No Known Problems Sister     Diabetes Brother     Hypertension Brother     Hypertension Sister     No Known Problems Sister        SOCIAL HISTORY:   Social History     Socioeconomic History    Marital status:     Number of children: 1   Tobacco Use    Smoking status: Former     Current packs/day: 0.00     Types: Cigarettes     Quit date: 1/1/1976     Years since  "quittin.0    Smokeless tobacco: Never    Tobacco comments:     quit in her 30's   Substance and Sexual Activity    Alcohol use: No    Drug use: No    Sexual activity: Never     Partners: Male   Social History Narrative    , one daughter who is a nurse at [employer].       Social Drivers of Health     Financial Resource Strain: Not At Risk (4/10/2023)    [facility] from [facility]    Financial Resource Strain     Is it hard for you to pay for the very basics like food, housing, medical care or heating?: No   Food Insecurity: Not At Risk (4/10/2023)    [facility] from [facility]    Food Insecurity     Does your food run out before you have the money to buy more?: No   Transportation Needs: Not At Risk (4/10/2023)    [facility]    Transportation Needs     Does a lack of transportation keep you from your medical appointments or from getting your medications?: No       VITALS:  BP (!) 205/98   Pulse 85   Temp 97.7  F (36.5  C) (Oral)   Resp 18   Ht 1.676 m (5' 6\")   Wt 78.9 kg (174 lb)   SpO2 95%   BMI 28.08 kg/m      PHYSICAL EXAM    Physical Exam  Constitutional:       General: She is not in acute distress.  HENT:      Head: Normocephalic and atraumatic.      Mouth/Throat:      Pharynx: Oropharynx is clear.   Eyes:      Pupils: Pupils are equal, round, and reactive to light.   Cardiovascular:      Rate and Rhythm: Normal rate and regular rhythm.      Pulses: Normal pulses.      Heart sounds: Normal heart sounds.   Pulmonary:      Effort: Pulmonary effort is normal.      Breath sounds: Normal breath sounds.   Abdominal:      General: Abdomen is flat. Bowel sounds are normal.      Palpations: Abdomen is soft.      Tenderness: There is no abdominal tenderness.   Musculoskeletal:         General: Normal range of motion.   Skin:     General: Skin is warm and dry.      Capillary Refill: Capillary refill takes less than 2 " seconds.   Neurological:      General: No focal deficit present.      Mental Status: She is alert and oriented to person, place, and time.           LAB:  All pertinent labs reviewed and interpreted.  Labs Ordered and Resulted from Time of ED Arrival to Time of ED Departure   BASIC METABOLIC PANEL - Abnormal       Result Value    Sodium 142      Potassium 3.8      Chloride 109 (*)     Carbon Dioxide (CO2) 24      Anion Gap 9      Urea Nitrogen 20.6      Creatinine 0.69      GFR Estimate 89      Calcium 8.9      Glucose 127 (*)    CBC WITH PLATELETS AND DIFFERENTIAL - Abnormal    WBC Count 3.8 (*)     RBC Count 4.45      Hemoglobin 13.4      Hematocrit 40.8      MCV 92      MCH 30.1      MCHC 32.8      RDW 12.8      Platelet Count 169      % Neutrophils 52      % Lymphocytes 35      % Monocytes 9      % Eosinophils 4      % Basophils 1      % Immature Granulocytes 0      NRBCs per 100 WBC 0      Absolute Neutrophils 1.9      Absolute Lymphocytes 1.3      Absolute Monocytes 0.3      Absolute Eosinophils 0.1      Absolute Basophils 0.0      Absolute Immature Granulocytes 0.0      Absolute NRBCs 0.0     HEPATIC FUNCTION PANEL - Normal    Protein Total 6.6      Albumin 4.0      Bilirubin Total 0.2      Alkaline Phosphatase 83      AST 33      ALT 22      Bilirubin Direct <0.20     LIPASE - Normal    Lipase 47     TROPONIN T, HIGH SENSITIVITY - Normal    Troponin T, High Sensitivity 9     INFLUENZA A/B, RSV AND SARS-COV2 PCR - Normal    Influenza A PCR Negative      Influenza B PCR Negative      RSV PCR Negative      SARS CoV2 PCR Negative     TROPONIN T, HIGH SENSITIVITY - Normal    Troponin T, High Sensitivity 8         RADIOLOGY:  Reviewed all pertinent imaging. Please see official radiology report.  Chest XR,  PA & LAT   Final Result   IMPRESSION:       Cardiac silhouette is normal in size. Mediastinal borders are well-defined. Mild aortic atheromatous calcifications. Normal vascular pedicle. Normal lung vascularity.       The lungs are symmetrically inflated. There are no interstitial or alveolar opacities. Diaphragm curvature is preserved. No pleural effusion.      Disc space narrowing and small marginal osteophytes of the mid and low thoracic spine. No displaced fractures or aggressive bone lesions identified.          EKG:    Performed at: 07:20:41    Impression: Sinus rhythm.    Rate: 73 bpm  Rhythm: Sinus  Axis: 54  UT Interval: 196 ms  QRS Interval: 80 ms  QTc Interval: 427 ms  ST Changes: N/A  Comparison: When compared with EKG from 03-May-2017 12:12, no significant change was found.     I have independently reviewed and interpreted the EKG(s) documented above.      I, Kenia Bronson, am serving as a scribe to document services personally performed by Dr. Karlee Gonzalez based on my observation and the provider's statements to me. IKarlee, DO attest that Kenia Bronson is acting in a scribe capacity, has observed my performance of the services and has documented them in accordance with my direction.    Karlee Gonzalez DO  Emergency Medicine  [facility] EMERGENCY ROOM  [address] [facility] [address]  [phone]  Dept: [phone]     Karlee Gonzalez DO  12/31/24 0635

## 2025-05-30 ENCOUNTER — HOSPITAL ENCOUNTER (OUTPATIENT)
Dept: MAMMOGRAPHY | Facility: CLINIC | Age: 80
Discharge: HOME | End: 2025-05-30
Attending: FAMILY MEDICINE | Admitting: FAMILY MEDICINE
Payer: COMMERCIAL

## 2025-05-30 DIAGNOSIS — Z12.31 VISIT FOR SCREENING MAMMOGRAM: ICD-10-CM

## 2025-05-30 PROCEDURE — 77067 SCR MAMMO BI INCL CAD: CPT
